# Patient Record
Sex: FEMALE | Race: ASIAN | NOT HISPANIC OR LATINO
[De-identification: names, ages, dates, MRNs, and addresses within clinical notes are randomized per-mention and may not be internally consistent; named-entity substitution may affect disease eponyms.]

---

## 2020-07-24 PROBLEM — Z00.00 ENCOUNTER FOR PREVENTIVE HEALTH EXAMINATION: Status: ACTIVE | Noted: 2020-07-24

## 2020-07-28 ENCOUNTER — APPOINTMENT (OUTPATIENT)
Dept: ANTEPARTUM | Facility: CLINIC | Age: 35
End: 2020-07-28
Payer: COMMERCIAL

## 2020-07-28 ENCOUNTER — TRANSCRIPTION ENCOUNTER (OUTPATIENT)
Age: 35
End: 2020-07-28

## 2020-07-28 ENCOUNTER — ASOB RESULT (OUTPATIENT)
Age: 35
End: 2020-07-28

## 2020-07-28 PROCEDURE — 99204 OFFICE O/P NEW MOD 45 MIN: CPT | Mod: 95

## 2020-08-18 ENCOUNTER — APPOINTMENT (OUTPATIENT)
Dept: ANTEPARTUM | Facility: CLINIC | Age: 35
End: 2020-08-18
Payer: COMMERCIAL

## 2020-08-18 ENCOUNTER — ASOB RESULT (OUTPATIENT)
Age: 35
End: 2020-08-18

## 2020-08-18 PROCEDURE — 36416 COLLJ CAPILLARY BLOOD SPEC: CPT

## 2020-08-18 PROCEDURE — 76813 OB US NUCHAL MEAS 1 GEST: CPT

## 2020-08-18 PROCEDURE — 76801 OB US < 14 WKS SINGLE FETUS: CPT

## 2020-10-13 ENCOUNTER — ASOB RESULT (OUTPATIENT)
Age: 35
End: 2020-10-13

## 2020-10-13 ENCOUNTER — APPOINTMENT (OUTPATIENT)
Dept: ANTEPARTUM | Facility: CLINIC | Age: 35
End: 2020-10-13
Payer: COMMERCIAL

## 2020-10-13 PROCEDURE — 76811 OB US DETAILED SNGL FETUS: CPT

## 2020-10-27 ENCOUNTER — APPOINTMENT (OUTPATIENT)
Dept: ANTEPARTUM | Facility: CLINIC | Age: 35
End: 2020-10-27
Payer: COMMERCIAL

## 2020-10-27 ENCOUNTER — ASOB RESULT (OUTPATIENT)
Age: 35
End: 2020-10-27

## 2020-10-27 PROCEDURE — 99072 ADDL SUPL MATRL&STAF TM PHE: CPT

## 2020-10-27 PROCEDURE — 76816 OB US FOLLOW-UP PER FETUS: CPT

## 2020-11-10 ENCOUNTER — NON-APPOINTMENT (OUTPATIENT)
Age: 35
End: 2020-11-10

## 2020-11-11 ENCOUNTER — APPOINTMENT (OUTPATIENT)
Dept: ENDOCRINOLOGY | Facility: CLINIC | Age: 35
End: 2020-11-11
Payer: COMMERCIAL

## 2020-11-11 ENCOUNTER — NON-APPOINTMENT (OUTPATIENT)
Age: 35
End: 2020-11-11

## 2020-11-11 VITALS
HEIGHT: 64.76 IN | OXYGEN SATURATION: 98 % | WEIGHT: 181.19 LBS | BODY MASS INDEX: 30.56 KG/M2 | TEMPERATURE: 99.3 F | SYSTOLIC BLOOD PRESSURE: 142 MMHG | HEART RATE: 108 BPM | DIASTOLIC BLOOD PRESSURE: 85 MMHG

## 2020-11-11 PROCEDURE — 36415 COLL VENOUS BLD VENIPUNCTURE: CPT

## 2020-11-11 PROCEDURE — 99072 ADDL SUPL MATRL&STAF TM PHE: CPT

## 2020-11-11 PROCEDURE — 99205 OFFICE O/P NEW HI 60 MIN: CPT | Mod: 25

## 2020-11-12 LAB
T4 FREE SERPL-MCNC: 1 NG/DL
TSH SERPL-ACNC: 4.28 UIU/ML

## 2020-11-13 NOTE — HISTORY OF PRESENT ILLNESS
[FreeTextEntry1] : CC: Hypothyroidism\par \par This is a 35-year-old female with subclinical hypothyroidism, hypertension, here for consultation.\par She is currently 24 weeks and 3 days pregnant.\par Blood work from July 22, 2020 showed TSH 7.03.  Free T4 was normal.  Repeat blood work on September 2, 2020 showed TSH 5.200, total T4 11.8.\par There is no prior history of thyroid disease.  There is no history of thyroid disease in the family.\par She reports constipation.\par LMP 5/24/2020\par TRAE 2/28/2021

## 2020-11-13 NOTE — PHYSICAL EXAM
[Alert] : alert [Well Nourished] : well nourished [Healthy Appearance] : healthy appearance [No Acute Distress] : no acute distress [Well Developed] : well developed [Normal Voice/Communication] : normal voice communication [Normal Sclera/Conjunctiva] : normal sclera/conjunctiva [No Proptosis] : no proptosis [No Neck Mass] : no neck mass was observed [No LAD] : no lymphadenopathy [Supple] : the neck was supple [Thyroid Not Enlarged] : the thyroid was not enlarged [No Thyroid Nodules] : no palpable thyroid nodules [No Respiratory Distress] : no respiratory distress [No Accessory Muscle Use] : no accessory muscle use [Normal Rate and Effort] : normal respiratory rate and effort [Normal Rate] : heart rate was normal [No Edema] : no peripheral edema [Normal Gait] : normal gait [No Clubbing, Cyanosis] : no clubbing  or cyanosis of the fingernails [No Involuntary Movements] : no involuntary movements were seen [No Tremors] : no tremors [Normal Affect] : the affect was normal [Normal Insight/Judgement] : insight and judgment were intact [Normal Mood] : the mood was normal [Acanthosis Nigricans] : no acanthosis nigricans [Hirsutism] : no hirsutism

## 2020-11-13 NOTE — CONSULT LETTER
[Dear  ___] : Dear  [unfilled], [Consult Letter:] : I had the pleasure of evaluating your patient, [unfilled]. [Please see my note below.] : Please see my note below. [Consult Closing:] : Thank you very much for allowing me to participate in the care of this patient.  If you have any questions, please do not hesitate to contact me. [Sincerely,] : Sincerely, [FreeTextEntry3] : Bucky Colin MD, FACE\par

## 2020-11-13 NOTE — ASSESSMENT
[Levothyroxine] : The patient was instructed to take Levothyroxine on an empty stomach, separate from vitamins, and wait at least 30 minutes before eating [FreeTextEntry1] : This is a 35-year-old female with subclinical hypothyroidism who is currently 24 weeks and 3 days pregnant.\par Initiate levothyroxine 100 mcg daily.\par She was advised to take this in the morning on an empty stomach, separate from her prenatal vitamin.\par Check TFTs in 2 weeks.\par Complications of uncontrolled hypothyroidism during pregnancy were reviewed.\par

## 2020-11-13 NOTE — REVIEW OF SYSTEMS
[Recent Weight Gain (___ Lbs)] : recent weight gain: [unfilled] lbs [Constipation] : constipation [All other systems negative] : All other systems negative

## 2020-11-15 LAB
THYROGLOB AB SERPL-ACNC: <20 IU/ML
THYROPEROXIDASE AB SERPL IA-ACNC: <10 IU/ML

## 2020-11-25 ENCOUNTER — APPOINTMENT (OUTPATIENT)
Dept: ANTEPARTUM | Facility: CLINIC | Age: 35
End: 2020-11-25
Payer: COMMERCIAL

## 2020-11-25 ENCOUNTER — ASOB RESULT (OUTPATIENT)
Age: 35
End: 2020-11-25

## 2020-11-25 PROCEDURE — 99072 ADDL SUPL MATRL&STAF TM PHE: CPT

## 2020-11-25 PROCEDURE — 76816 OB US FOLLOW-UP PER FETUS: CPT

## 2020-11-25 PROCEDURE — 76819 FETAL BIOPHYS PROFIL W/O NST: CPT

## 2020-12-03 ENCOUNTER — OUTPATIENT (OUTPATIENT)
Dept: INPATIENT UNIT | Facility: HOSPITAL | Age: 35
LOS: 1 days | Discharge: HOME | End: 2020-12-03
Payer: COMMERCIAL

## 2020-12-03 VITALS
HEART RATE: 95 BPM | DIASTOLIC BLOOD PRESSURE: 94 MMHG | TEMPERATURE: 98 F | SYSTOLIC BLOOD PRESSURE: 177 MMHG | RESPIRATION RATE: 16 BRPM

## 2020-12-03 VITALS — HEART RATE: 94 BPM | SYSTOLIC BLOOD PRESSURE: 146 MMHG | DIASTOLIC BLOOD PRESSURE: 94 MMHG

## 2020-12-03 DIAGNOSIS — O26.899 OTHER SPECIFIED PREGNANCY RELATED CONDITIONS, UNSPECIFIED TRIMESTER: ICD-10-CM

## 2020-12-03 DIAGNOSIS — Z3A.00 WEEKS OF GESTATION OF PREGNANCY NOT SPECIFIED: ICD-10-CM

## 2020-12-03 DIAGNOSIS — Z98.890 OTHER SPECIFIED POSTPROCEDURAL STATES: Chronic | ICD-10-CM

## 2020-12-03 LAB
ALBUMIN SERPL ELPH-MCNC: 3.4 G/DL — SIGNIFICANT CHANGE UP (ref 3.3–5)
ALP SERPL-CCNC: 66 U/L — SIGNIFICANT CHANGE UP (ref 40–120)
ALT FLD-CCNC: 13 U/L — SIGNIFICANT CHANGE UP (ref 4–33)
ANION GAP SERPL CALC-SCNC: 11 MMO/L — SIGNIFICANT CHANGE UP (ref 7–14)
APPEARANCE UR: CLEAR — SIGNIFICANT CHANGE UP
APTT BLD: 26.9 SEC — LOW (ref 27–36.3)
AST SERPL-CCNC: 13 U/L — SIGNIFICANT CHANGE UP (ref 4–32)
BASOPHILS # BLD AUTO: 0.04 K/UL — SIGNIFICANT CHANGE UP (ref 0–0.2)
BASOPHILS NFR BLD AUTO: 0.3 % — SIGNIFICANT CHANGE UP (ref 0–2)
BILIRUB SERPL-MCNC: 0.3 MG/DL — SIGNIFICANT CHANGE UP (ref 0.2–1.2)
BILIRUB UR-MCNC: NEGATIVE — SIGNIFICANT CHANGE UP
BLOOD UR QL VISUAL: NEGATIVE — SIGNIFICANT CHANGE UP
BUN SERPL-MCNC: 8 MG/DL — SIGNIFICANT CHANGE UP (ref 7–23)
CALCIUM SERPL-MCNC: 7.8 MG/DL — LOW (ref 8.4–10.5)
CHLORIDE SERPL-SCNC: 106 MMOL/L — SIGNIFICANT CHANGE UP (ref 98–107)
CO2 SERPL-SCNC: 19 MMOL/L — LOW (ref 22–31)
COLOR SPEC: COLORLESS — SIGNIFICANT CHANGE UP
CREAT ?TM UR-MCNC: 10 MG/DL — SIGNIFICANT CHANGE UP
CREAT SERPL-MCNC: 0.54 MG/DL — SIGNIFICANT CHANGE UP (ref 0.5–1.3)
EOSINOPHIL # BLD AUTO: 0.09 K/UL — SIGNIFICANT CHANGE UP (ref 0–0.5)
EOSINOPHIL NFR BLD AUTO: 0.8 % — SIGNIFICANT CHANGE UP (ref 0–6)
FIBRINOGEN PPP-MCNC: 613 MG/DL — HIGH (ref 290–520)
GLUCOSE SERPL-MCNC: 82 MG/DL — SIGNIFICANT CHANGE UP (ref 70–99)
GLUCOSE UR-MCNC: NEGATIVE — SIGNIFICANT CHANGE UP
HCT VFR BLD CALC: 39.3 % — SIGNIFICANT CHANGE UP (ref 34.5–45)
HGB BLD-MCNC: 12.7 G/DL — SIGNIFICANT CHANGE UP (ref 11.5–15.5)
IMM GRANULOCYTES NFR BLD AUTO: 0.8 % — SIGNIFICANT CHANGE UP (ref 0–1.5)
INR BLD: 1.03 — SIGNIFICANT CHANGE UP (ref 0.88–1.16)
KETONES UR-MCNC: NEGATIVE — SIGNIFICANT CHANGE UP
LDH SERPL L TO P-CCNC: 167 U/L — SIGNIFICANT CHANGE UP (ref 135–225)
LEUKOCYTE ESTERASE UR-ACNC: NEGATIVE — SIGNIFICANT CHANGE UP
LYMPHOCYTES # BLD AUTO: 1.54 K/UL — SIGNIFICANT CHANGE UP (ref 1–3.3)
LYMPHOCYTES # BLD AUTO: 13.1 % — SIGNIFICANT CHANGE UP (ref 13–44)
MCHC RBC-ENTMCNC: 28.7 PG — SIGNIFICANT CHANGE UP (ref 27–34)
MCHC RBC-ENTMCNC: 32.3 % — SIGNIFICANT CHANGE UP (ref 32–36)
MCV RBC AUTO: 88.7 FL — SIGNIFICANT CHANGE UP (ref 80–100)
MONOCYTES # BLD AUTO: 0.79 K/UL — SIGNIFICANT CHANGE UP (ref 0–0.9)
MONOCYTES NFR BLD AUTO: 6.7 % — SIGNIFICANT CHANGE UP (ref 2–14)
NEUTROPHILS # BLD AUTO: 9.2 K/UL — HIGH (ref 1.8–7.4)
NEUTROPHILS NFR BLD AUTO: 78.3 % — HIGH (ref 43–77)
NITRITE UR-MCNC: NEGATIVE — SIGNIFICANT CHANGE UP
NRBC # FLD: 0 K/UL — SIGNIFICANT CHANGE UP (ref 0–0)
PH UR: 6.5 — SIGNIFICANT CHANGE UP (ref 5–8)
PLATELET # BLD AUTO: 285 K/UL — SIGNIFICANT CHANGE UP (ref 150–400)
PMV BLD: 9.3 FL — SIGNIFICANT CHANGE UP (ref 7–13)
POTASSIUM SERPL-MCNC: 3.2 MMOL/L — LOW (ref 3.5–5.3)
POTASSIUM SERPL-SCNC: 3.2 MMOL/L — LOW (ref 3.5–5.3)
PROT SERPL-MCNC: 6.1 G/DL — SIGNIFICANT CHANGE UP (ref 6–8.3)
PROT UR-MCNC: < 4 MG/DL — SIGNIFICANT CHANGE UP
PROT UR-MCNC: NEGATIVE — SIGNIFICANT CHANGE UP
PROTHROM AB SERPL-ACNC: 11.8 SEC — SIGNIFICANT CHANGE UP (ref 10.6–13.6)
RBC # BLD: 4.43 M/UL — SIGNIFICANT CHANGE UP (ref 3.8–5.2)
RBC # FLD: 13.6 % — SIGNIFICANT CHANGE UP (ref 10.3–14.5)
SODIUM SERPL-SCNC: 136 MMOL/L — SIGNIFICANT CHANGE UP (ref 135–145)
SP GR SPEC: 1 — LOW (ref 1–1.04)
URATE SERPL-MCNC: 3.5 MG/DL — SIGNIFICANT CHANGE UP (ref 2.5–7)
UROBILINOGEN FLD QL: NORMAL — SIGNIFICANT CHANGE UP
WBC # BLD: 11.75 K/UL — HIGH (ref 3.8–10.5)
WBC # FLD AUTO: 11.75 K/UL — HIGH (ref 3.8–10.5)

## 2020-12-03 PROCEDURE — 76818 FETAL BIOPHYS PROFILE W/NST: CPT | Mod: 26

## 2020-12-03 PROCEDURE — 99214 OFFICE O/P EST MOD 30 MIN: CPT

## 2020-12-03 RX ORDER — LABETALOL HCL 100 MG
200 TABLET ORAL ONCE
Refills: 0 | Status: COMPLETED | OUTPATIENT
Start: 2020-12-03 | End: 2020-12-03

## 2020-12-03 RX ADMIN — Medication 200 MILLIGRAM(S): at 23:11

## 2020-12-03 NOTE — OB PROVIDER TRIAGE NOTE - NSOBPROVIDERNOTE_OBGYN_ALL_OB_FT
Evidence of superimposed preeclampsia w/o sf     d/W Dr. Henry     -take procardia dose now  -check bp in 1 hour     -follow up in office on Monday  -rx for labetalol 200 mg bid sent to pharmacy Evidence of superimposed preeclampsia w/o sf     d/W Dr. Henry     -take procardia dose now  -check bp in 1 hour   -repeat bp 158/100, 146/94    d/w Dr. Henry   -give 1 dose of labetalol 200 mg stat   -cleared for discharge home   -follow up in office on Monday for bp check   -rx for labetalol 200 mg bid sent to pharmacy  -fetal kick count reviewed   -warning signs reviewed

## 2020-12-03 NOTE — OB PROVIDER TRIAGE NOTE - HISTORY OF PRESENT ILLNESS
36 yo  27 4/7 weeks sent in from the office for elevated bp (150/108). Pt reports good fetal movement. Denies leaking of fluid, vaginal bleeding, pain, headache, visual changes, or epigastric pain.     Current a/p complications: CHTN on nifedipine 30 mg daily, was on labetalol 100 mg bid as well but was discontinued with pregnancy     Allergies: NKDA  Medications: PNV, Nifedipine 30 mg, synthroid 100 mcg   PMH: CHTN, subclinical hypothyroidism   PSH: Left lumpectomy (benign)   OB/GYN: Denies   Social: Denies   Psychosocial: Denies    36 yo  27 4/7 weeks sent in from the office for elevated bp (150/108). Pt reports good fetal movement. Denies leaking of fluid, vaginal bleeding, pain, headache, visual changes, or epigastric pain.     Current a/p complications: CHTN on nifedipine 30 mg daily, was on labetalol 100 mg bid as well but was discontinued with pregnancy     Allergies: NKDA  Medications: PNV, Nifedipine 30 mg, synthroid 100 mcg   PMH: CHTN, subclinical hypothyroidism   PSH: Left lumpectomy (benign)   OB/GYN: Fibroids x 2   Social: Denies   Psychosocial: Denies

## 2020-12-03 NOTE — OB PROVIDER TRIAGE NOTE - NSHPPHYSICALEXAM_GEN_ALL_CORE
Abdomen soft, nontender     NST   toco     TAUS cephalic presentation, anterior placenta, autumn 17.31 cm, bpp 8/8 Abdomen soft, nontender     NST cat 1 tracing   toco no contractions by toco     TAUS cephalic presentation, anterior placenta, autumn 17.31 cm, bpp 8/8 Vital Signs Last 24 Hrs  T(C): 36.8 (03 Dec 2020 19:53), Max: 36.8 (03 Dec 2020 19:53)  T(F): 98.2 (03 Dec 2020 19:53), Max: 98.2 (03 Dec 2020 19:53)  HR: 89 (03 Dec 2020 21:32) (82 - 97)  BP: 140/91 (03 Dec 2020 21:32) (140/87 - 177/94)  RR: 16 (03 Dec 2020 19:53) (16 - 16)    Abdomen soft, nontender     NST cat 1 tracing   toco no contractions by toco     TAUS cephalic presentation, anterior placenta, autumn 17.31 cm, bpp 8/8

## 2020-12-03 NOTE — OB RN TRIAGE NOTE - CURRENT PREGNANCY COMPLICATIONS, OB PROFILE
Hypertensive Disorder Composite Graft Text: The defect edges were debeveled with a #15 scalpel blade.  Given the location of the defect, shape of the defect, the proximity to free margins and the fact the defect was full thickness a composite graft was deemed most appropriate.  The defect was outline and then transferred to the donor site.  A full thickness graft was then excised from the donor site. The graft was then placed in the primary defect, oriented appropriately and then sutured into place.  The secondary defect was then repaired using a primary closure.

## 2020-12-03 NOTE — OB PROVIDER TRIAGE NOTE - NSHPLABSRESULTS_GEN_ALL_CORE
CBC Full  -  ( 03 Dec 2020 20:15 )  WBC Count : 11.75 K/uL  RBC Count : 4.43 M/uL  Hemoglobin : 12.7 g/dL  Hematocrit : 39.3 %  Platelet Count - Automated : 285 K/uL  Mean Cell Volume : 88.7 fL  Mean Cell Hemoglobin : 28.7 pg  Mean Cell Hemoglobin Concentration : 32.3 %  Auto Neutrophil # : 9.20 K/uL  Auto Lymphocyte # : 1.54 K/uL  Auto Monocyte # : 0.79 K/uL  Auto Eosinophil # : 0.09 K/uL  Auto Basophil # : 0.04 K/uL  Auto Neutrophil % : 78.3 %  Auto Lymphocyte % : 13.1 %  Auto Monocyte % : 6.7 %  Auto Eosinophil % : 0.8 %  Auto Basophil % : 0.3 %        136  |  106  |  8   ----------------------------<  82  3.2<L>   |  19<L>  |  0.54    Ca    7.8<L>      03 Dec 2020 20:15    TPro  6.1  /  Alb  3.4  /  TBili  0.3  /  DBili  x   /  AST  13  /  ALT  13  /  AlkPhos  66  12-03    uric acid 3.5    ldh 167     PT/INR - ( 03 Dec 2020 10:15 )   PT: 11.8 SEC;   INR: 1.03          PTT - ( 03 Dec 2020 10:15 )  PTT:26.9 SEC    fibrinogen 613    Urinalysis Basic - ( 03 Dec 2020 20:18 )    Color: COLORLESS / Appearance: CLEAR / S.004 / pH: 6.5  Gluc: NEGATIVE / Ketone: NEGATIVE  / Bili: NEGATIVE / Urobili: NORMAL   Blood: NEGATIVE / Protein: NEGATIVE / Nitrite: NEGATIVE   Leuk Esterase: NEGATIVE / RBC: x / WBC x   Sq Epi: x / Non Sq Epi: x / Bacteria: x    PCR CBC Full  -  ( 03 Dec 2020 20:15 )  WBC Count : 11.75 K/uL  RBC Count : 4.43 M/uL  Hemoglobin : 12.7 g/dL  Hematocrit : 39.3 %  Platelet Count - Automated : 285 K/uL  Mean Cell Volume : 88.7 fL  Mean Cell Hemoglobin : 28.7 pg  Mean Cell Hemoglobin Concentration : 32.3 %  Auto Neutrophil # : 9.20 K/uL  Auto Lymphocyte # : 1.54 K/uL  Auto Monocyte # : 0.79 K/uL  Auto Eosinophil # : 0.09 K/uL  Auto Basophil # : 0.04 K/uL  Auto Neutrophil % : 78.3 %  Auto Lymphocyte % : 13.1 %  Auto Monocyte % : 6.7 %  Auto Eosinophil % : 0.8 %  Auto Basophil % : 0.3 %        136  |  106  |  8   ----------------------------<  82  3.2<L>   |  19<L>  |  0.54    Ca    7.8<L>      03 Dec 2020 20:15    TPro  6.1  /  Alb  3.4  /  TBili  0.3  /  DBili  x   /  AST  13  /  ALT  13  /  AlkPhos  66  12-03    uric acid 3.5    ldh 167     PT/INR - ( 03 Dec 2020 10:15 )   PT: 11.8 SEC;   INR: 1.03          PTT - ( 03 Dec 2020 10:15 )  PTT:26.9 SEC    fibrinogen 613    Urinalysis Basic - ( 03 Dec 2020 20:18 )    Color: COLORLESS / Appearance: CLEAR / S.004 / pH: 6.5  Gluc: NEGATIVE / Ketone: NEGATIVE  / Bili: NEGATIVE / Urobili: NORMAL   Blood: NEGATIVE / Protein: NEGATIVE / Nitrite: NEGATIVE   Leuk Esterase: NEGATIVE / RBC: x / WBC x   Sq Epi: x / Non Sq Epi: x / Bacteria: x    PCR 0.4

## 2020-12-03 NOTE — OB PROVIDER TRIAGE NOTE - ADDITIONAL INSTRUCTIONS
-give 1 dose of labetalol 200 mg stat   -cleared for discharge home   -follow up in office on Monday for bp check   -rx for labetalol 200 mg bid sent to pharmacy  -fetal kick count reviewed   -warning signs reviewed

## 2020-12-07 ENCOUNTER — APPOINTMENT (OUTPATIENT)
Dept: ENDOCRINOLOGY | Facility: CLINIC | Age: 35
End: 2020-12-07
Payer: COMMERCIAL

## 2020-12-07 ENCOUNTER — INPATIENT (INPATIENT)
Facility: HOSPITAL | Age: 35
LOS: 0 days | Discharge: HOME | End: 2020-12-08
Attending: STUDENT IN AN ORGANIZED HEALTH CARE EDUCATION/TRAINING PROGRAM | Admitting: STUDENT IN AN ORGANIZED HEALTH CARE EDUCATION/TRAINING PROGRAM
Payer: COMMERCIAL

## 2020-12-07 VITALS
RESPIRATION RATE: 16 BRPM | SYSTOLIC BLOOD PRESSURE: 132 MMHG | HEART RATE: 99 BPM | DIASTOLIC BLOOD PRESSURE: 92 MMHG | TEMPERATURE: 99 F

## 2020-12-07 VITALS
OXYGEN SATURATION: 98 % | WEIGHT: 182.3 LBS | HEART RATE: 91 BPM | BODY MASS INDEX: 30.01 KG/M2 | DIASTOLIC BLOOD PRESSURE: 74 MMHG | SYSTOLIC BLOOD PRESSURE: 110 MMHG | TEMPERATURE: 98.4 F | HEIGHT: 65.35 IN

## 2020-12-07 DIAGNOSIS — Z3A.00 WEEKS OF GESTATION OF PREGNANCY NOT SPECIFIED: ICD-10-CM

## 2020-12-07 DIAGNOSIS — O26.899 OTHER SPECIFIED PREGNANCY RELATED CONDITIONS, UNSPECIFIED TRIMESTER: ICD-10-CM

## 2020-12-07 DIAGNOSIS — Z98.890 OTHER SPECIFIED POSTPROCEDURAL STATES: Chronic | ICD-10-CM

## 2020-12-07 PROBLEM — I10 ESSENTIAL (PRIMARY) HYPERTENSION: Chronic | Status: ACTIVE | Noted: 2020-12-03

## 2020-12-07 PROBLEM — E03.9 HYPOTHYROIDISM, UNSPECIFIED: Chronic | Status: ACTIVE | Noted: 2020-12-03

## 2020-12-07 LAB
ALBUMIN SERPL ELPH-MCNC: 3.8 G/DL — SIGNIFICANT CHANGE UP (ref 3.3–5)
ALBUMIN SERPL ELPH-MCNC: 3.8 G/DL — SIGNIFICANT CHANGE UP (ref 3.3–5)
ALP SERPL-CCNC: 89 U/L — SIGNIFICANT CHANGE UP (ref 40–120)
ALP SERPL-CCNC: 92 U/L — SIGNIFICANT CHANGE UP (ref 40–120)
ALT FLD-CCNC: 12 U/L — SIGNIFICANT CHANGE UP (ref 4–33)
ALT FLD-CCNC: 12 U/L — SIGNIFICANT CHANGE UP (ref 4–33)
ANION GAP SERPL CALC-SCNC: 14 MMOL/L — SIGNIFICANT CHANGE UP (ref 7–14)
ANION GAP SERPL CALC-SCNC: 9 MMOL/L — SIGNIFICANT CHANGE UP (ref 7–14)
APPEARANCE UR: CLEAR — SIGNIFICANT CHANGE UP
APTT BLD: 27.3 SEC — SIGNIFICANT CHANGE UP (ref 27–36.3)
AST SERPL-CCNC: 17 U/L — SIGNIFICANT CHANGE UP (ref 4–32)
AST SERPL-CCNC: 17 U/L — SIGNIFICANT CHANGE UP (ref 4–32)
BILIRUB SERPL-MCNC: 0.6 MG/DL — SIGNIFICANT CHANGE UP (ref 0.2–1.2)
BILIRUB SERPL-MCNC: 0.7 MG/DL — SIGNIFICANT CHANGE UP (ref 0.2–1.2)
BILIRUB UR-MCNC: NEGATIVE — SIGNIFICANT CHANGE UP
BUN SERPL-MCNC: 5 MG/DL — LOW (ref 7–23)
BUN SERPL-MCNC: 5 MG/DL — LOW (ref 7–23)
CALCIUM SERPL-MCNC: 9.1 MG/DL — SIGNIFICANT CHANGE UP (ref 8.4–10.5)
CALCIUM SERPL-MCNC: 9.1 MG/DL — SIGNIFICANT CHANGE UP (ref 8.4–10.5)
CHLORIDE SERPL-SCNC: 102 MMOL/L — SIGNIFICANT CHANGE UP (ref 98–107)
CHLORIDE SERPL-SCNC: 103 MMOL/L — SIGNIFICANT CHANGE UP (ref 98–107)
CO2 SERPL-SCNC: 19 MMOL/L — LOW (ref 22–31)
CO2 SERPL-SCNC: 22 MMOL/L — SIGNIFICANT CHANGE UP (ref 22–31)
COLOR SPEC: SIGNIFICANT CHANGE UP
CREAT ?TM UR-MCNC: 45 MG/DL — SIGNIFICANT CHANGE UP
CREAT SERPL-MCNC: 0.58 MG/DL — SIGNIFICANT CHANGE UP (ref 0.5–1.3)
CREAT SERPL-MCNC: 0.58 MG/DL — SIGNIFICANT CHANGE UP (ref 0.5–1.3)
DIFF PNL FLD: NEGATIVE — SIGNIFICANT CHANGE UP
FIBRINOGEN PPP-MCNC: 729 MG/DL — HIGH (ref 300–520)
GLUCOSE SERPL-MCNC: 78 MG/DL — SIGNIFICANT CHANGE UP (ref 70–99)
GLUCOSE SERPL-MCNC: 89 MG/DL — SIGNIFICANT CHANGE UP (ref 70–99)
GLUCOSE UR QL: NEGATIVE — SIGNIFICANT CHANGE UP
HCT VFR BLD CALC: 37.2 % — SIGNIFICANT CHANGE UP (ref 34.5–45)
HGB BLD-MCNC: 12.2 G/DL — SIGNIFICANT CHANGE UP (ref 11.5–15.5)
INR BLD: 1.09 RATIO — SIGNIFICANT CHANGE UP (ref 0.88–1.17)
KETONES UR-MCNC: ABNORMAL
LDH SERPL L TO P-CCNC: 375 U/L — HIGH (ref 135–225)
LEUKOCYTE ESTERASE UR-ACNC: NEGATIVE — SIGNIFICANT CHANGE UP
MCHC RBC-ENTMCNC: 29 PG — SIGNIFICANT CHANGE UP (ref 27–34)
MCHC RBC-ENTMCNC: 32.8 GM/DL — SIGNIFICANT CHANGE UP (ref 32–36)
MCV RBC AUTO: 88.6 FL — SIGNIFICANT CHANGE UP (ref 80–100)
NITRITE UR-MCNC: NEGATIVE — SIGNIFICANT CHANGE UP
NRBC # BLD: 0 /100 WBCS — SIGNIFICANT CHANGE UP
NRBC # FLD: 0 K/UL — SIGNIFICANT CHANGE UP
PH UR: 7.5 — SIGNIFICANT CHANGE UP (ref 5–8)
PLATELET # BLD AUTO: 265 K/UL — SIGNIFICANT CHANGE UP (ref 150–400)
POTASSIUM SERPL-MCNC: 3.6 MMOL/L — SIGNIFICANT CHANGE UP (ref 3.5–5.3)
POTASSIUM SERPL-MCNC: 4.1 MMOL/L — SIGNIFICANT CHANGE UP (ref 3.5–5.3)
POTASSIUM SERPL-SCNC: 3.6 MMOL/L — SIGNIFICANT CHANGE UP (ref 3.5–5.3)
POTASSIUM SERPL-SCNC: 4.1 MMOL/L — SIGNIFICANT CHANGE UP (ref 3.5–5.3)
PROT ?TM UR-MCNC: 6 MG/DL — SIGNIFICANT CHANGE UP
PROT SERPL-MCNC: 6.8 G/DL — SIGNIFICANT CHANGE UP (ref 6–8.3)
PROT SERPL-MCNC: 6.8 G/DL — SIGNIFICANT CHANGE UP (ref 6–8.3)
PROT UR-MCNC: NEGATIVE — SIGNIFICANT CHANGE UP
PROTHROM AB SERPL-ACNC: 12.5 SEC — SIGNIFICANT CHANGE UP (ref 9.8–13.1)
RBC # BLD: 4.2 M/UL — SIGNIFICANT CHANGE UP (ref 3.8–5.2)
RBC # FLD: 13.6 % — SIGNIFICANT CHANGE UP (ref 10.3–14.5)
RH IG SCN BLD-IMP: POSITIVE — SIGNIFICANT CHANGE UP
SARS-COV-2 RNA SPEC QL NAA+PROBE: SIGNIFICANT CHANGE UP
SODIUM SERPL-SCNC: 134 MMOL/L — LOW (ref 135–145)
SODIUM SERPL-SCNC: 135 MMOL/L — SIGNIFICANT CHANGE UP (ref 135–145)
SP GR SPEC: 1.01 — LOW (ref 1.01–1.02)
THROMBIN TIME: 21.2 SEC — SIGNIFICANT CHANGE UP (ref 16–26)
URATE SERPL-MCNC: 4.6 MG/DL — SIGNIFICANT CHANGE UP (ref 2.5–7)
URATE SERPL-MCNC: 5.3 MG/DL — SIGNIFICANT CHANGE UP (ref 2.5–7)
UROBILINOGEN FLD QL: SIGNIFICANT CHANGE UP
WBC # BLD: 11.86 K/UL — HIGH (ref 3.8–10.5)
WBC # FLD AUTO: 11.86 K/UL — HIGH (ref 3.8–10.5)

## 2020-12-07 PROCEDURE — 99072 ADDL SUPL MATRL&STAF TM PHE: CPT

## 2020-12-07 PROCEDURE — 99213 OFFICE O/P EST LOW 20 MIN: CPT | Mod: 25

## 2020-12-07 PROCEDURE — 36415 COLL VENOUS BLD VENIPUNCTURE: CPT

## 2020-12-07 RX ORDER — LABETALOL HCL 100 MG
200 TABLET ORAL EVERY 12 HOURS
Refills: 0 | Status: DISCONTINUED | OUTPATIENT
Start: 2020-12-07 | End: 2020-12-08

## 2020-12-07 RX ORDER — NIFEDIPINE 30 MG
30 TABLET, EXTENDED RELEASE 24 HR ORAL DAILY
Refills: 0 | Status: DISCONTINUED | OUTPATIENT
Start: 2020-12-07 | End: 2020-12-08

## 2020-12-07 RX ORDER — LEVOTHYROXINE SODIUM 125 MCG
100 TABLET ORAL DAILY
Refills: 0 | Status: DISCONTINUED | OUTPATIENT
Start: 2020-12-08 | End: 2020-12-08

## 2020-12-07 RX ORDER — ASPIRIN/CALCIUM CARB/MAGNESIUM 324 MG
81 TABLET ORAL DAILY
Refills: 0 | Status: DISCONTINUED | OUTPATIENT
Start: 2020-12-07 | End: 2020-12-08

## 2020-12-07 RX ADMIN — Medication 200 MILLIGRAM(S): at 16:16

## 2020-12-07 RX ADMIN — Medication 81 MILLIGRAM(S): at 18:20

## 2020-12-07 RX ADMIN — Medication 30 MILLIGRAM(S): at 22:37

## 2020-12-07 NOTE — OB PROVIDER TRIAGE NOTE - NSHPPHYSICALEXAM_GEN_ALL_CORE
Vital Signs Last 24 Hrs  T(C): 37 (07 Dec 2020 11:37), Max: 37 (07 Dec 2020 11:37)  T(F): 98.6 (07 Dec 2020 11:37), Max: 98.6 (07 Dec 2020 11:37)  HR: 83 (07 Dec 2020 13:08) (81 - 99)  BP: 127/80 (07 Dec 2020 13:08) (121/76 - 132/92)  BP(mean): --  RR: 16 (07 Dec 2020 11:37) (16 - 16)  SpO2: --    Abdomen gravid, soft and nontender  NST -  [facility] Labs

## 2020-12-07 NOTE — OB PROVIDER TRIAGE NOTE - NSHPLABSRESULTS_GEN_ALL_CORE
[facility] LABS CBC -  PCR -  AST/ALT -  LDH -  Serum Creatinine -  Uric acid -  PT/PTT -  INR -  FA -  UA -                          12.2   11.86 )-----------( 265      ( 07 Dec 2020 12:09 )             37.2     12    134<L>  |  103  |  5<L>  ----------------------------<  89  4.1   |  22  |  0.58    Ca    9.1      07 Dec 2020 12:09    TPro  6.8  /  Alb  3.8  /  TBili  0.6  /  DBili  x   /  AST  17  /  ALT  12  /  AlkPhos  89  12-07          Urinalysis Basic - ( 07 Dec 2020 12:10 )    Color: Light Yellow / Appearance: Clear / S.009 / pH: x  Gluc: x / Ketone: Trace  / Bili: Negative / Urobili: <2 mg/dL   Blood: x / Protein: Negative / Nitrite: Negative   Leuk Esterase: Negative / RBC: x / WBC x   Sq Epi: x / Non Sq Epi: x / Bacteria: x      PT/INR - ( 07 Dec 2020 12:10 )   PT: 12.5 sec;   INR: 1.09 ratio         PTT - ( 07 Dec 2020 12:10 )  PTT:27.3 sec    CAPILLARY BLOOD GLUCOSE      P/c ratio 0.13    Fibrinogen Assay Fibrinogen Assay: 729 mg/dL (20 @ 12:10)

## 2020-12-07 NOTE — OB PROVIDER H&P - NSHPLABSRESULTS_GEN_ALL_CORE
CBC -  PCR -  AST/ALT -  LDH -  Serum Creatinine -  Uric acid -  PT/PTT -  INR -  FA -  UA -                          12.2   11.86 )-----------( 265      ( 07 Dec 2020 12:09 )             37.2     12    134<L>  |  103  |  5<L>  ----------------------------<  89  4.1   |  22  |  0.58    Ca    9.1      07 Dec 2020 12:09    TPro  6.8  /  Alb  3.8  /  TBili  0.6  /  DBili  x   /  AST  17  /  ALT  12  /  AlkPhos  89  12-07          Urinalysis Basic - ( 07 Dec 2020 12:10 )    Color: Light Yellow / Appearance: Clear / S.009 / pH: x  Gluc: x / Ketone: Trace  / Bili: Negative / Urobili: <2 mg/dL   Blood: x / Protein: Negative / Nitrite: Negative   Leuk Esterase: Negative / RBC: x / WBC x   Sq Epi: x / Non Sq Epi: x / Bacteria: x      PT/INR - ( 07 Dec 2020 12:10 )   PT: 12.5 sec;   INR: 1.09 ratio         PTT - ( 07 Dec 2020 12:10 )  PTT:27.3 sec    CAPILLARY BLOOD GLUCOSE      P/c ratio 0.13    Fibrinogen Assay Fibrinogen Assay: 729 mg/dL (20 @ 12:10)

## 2020-12-07 NOTE — ASSESSMENT
[FreeTextEntry1] : This is a 35-year-old female with subclinical hypothyroidism who is currently 28 weeks and 1 day pregnant.\par Continue levothyroxine 100 mcg daily for now.\par Check TFTs. \par She is clinically euthyroid. \par

## 2020-12-07 NOTE — OB PROVIDER TRIAGE NOTE - HISTORY OF PRESENT ILLNESS
sent from Dr Scanlon's office elevated /100 140/96 , siPEC   denies any headache visual disturbances or right upper epigastric pain sent from Dr Scanlon's office elevated /100 140/96 , siPEC   denies any headache visual disturbances or right upper epigastric pain     Patient is a 36y/o  @ 28 1/7wks gestation who was sent to triage from the office to r/o pec.    Denies headache visual disturbances, nausea, vomiting or RUQ/Epigastric pain.  Denies contractions, loss of fluid or vaginal bleeding.  Reports good fetal movements.    AP Course:  Patient is a known chronic  hypertensive; on Nifedipine 30er daily.  Found to have elevated B/Ps in the office on 12/3.  Was evaluated in triage and discharged home with Labetalol 200mg bid.  Meds - pnv, levothyroxine 100mcg daily, Nifedipine 30ER daily, & Labetalol 200mg bid, ASA 81mg daily.  NKDA

## 2020-12-07 NOTE — PHYSICAL EXAM
[Alert] : alert [Well Nourished] : well nourished [Healthy Appearance] : healthy appearance [No Acute Distress] : no acute distress [Well Developed] : well developed [Normal Voice/Communication] : normal voice communication [Normal Sclera/Conjunctiva] : normal sclera/conjunctiva [No Proptosis] : no proptosis [No Neck Mass] : no neck mass was observed [No LAD] : no lymphadenopathy [Supple] : the neck was supple [Thyroid Not Enlarged] : the thyroid was not enlarged [No Thyroid Nodules] : no palpable thyroid nodules [No Respiratory Distress] : no respiratory distress [No Accessory Muscle Use] : no accessory muscle use [Normal Rate and Effort] : normal respiratory rate and effort [Normal Rate] : heart rate was normal [No Edema] : no peripheral edema [Normal Gait] : normal gait [No Clubbing, Cyanosis] : no clubbing  or cyanosis of the fingernails [No Involuntary Movements] : no involuntary movements were seen [Acanthosis Nigricans] : no acanthosis nigricans [Hirsutism] : no hirsutism [No Tremors] : no tremors [Normal Affect] : the affect was normal [Normal Insight/Judgement] : insight and judgment were intact [Normal Mood] : the mood was normal [de-identified] : gravid

## 2020-12-07 NOTE — OB PROVIDER TRIAGE NOTE - NSOBPROVIDERNOTE_OBGYN_ALL_OB_FT
NKA  med hx:  cHTN dxed 2011   subclinical hypothyroid - Levothryoxine 100 mcg po qd  surg hx:   left breast lumpectomy- benign  gyn hx:   left breast lumpectomy- benign  fibroids x's 2  ob hx:   denies  meds:  Labetalol 200 mg po BID last dose 12/6 @ 2300, started 12/4   Procardia 30 mg XL last dose 12/6 @ 2300 prior to pregnancy   levothyroxine 100 mcg po qd  baby ASA   PNV   Vitamin D       EMTALA :   145/103 103 18 36.8 NKA  med hx:  cHTN dxed    subclinical hypothyroid - Levothryoxine 100 mcg po qd  surg hx:   left breast lumpectomy- benign  gyn hx:   left breast lumpectomy- benign  fibroids x's 2  ob hx:   denies  meds:  Labetalol 200 mg po BID last dose  @ 2300, started    Procardia 30 mg XL last dose  @ 2300 prior to pregnancy   levothyroxine 100 mcg po qd  baby ASA   PNV   Vitamin D       EMTALA :   145/103 103 18 36.8      PNC Dr Díaz  EDC 2021    Patient is a 36y/o  @ 28 1/7wks gestation who was sent to triage from the office to r/o pec.  Denies heache, visual disturbances, nausea, vomiting or RUQ/Epigastric pain.  Denies contractions, loss of fluid or vaginal bleeding.  Reports good fetal movements.    AP Course  Patient is a known chronic hypertensive; on Nifedipine 30er daily.  Found to have elevated B/Ps in the office on 12/3.  Was evaluated in triage and discharged home with Labetalol 200mg bid.  Meds - pnv, levothyroxine 100mcg daily, Nifedipine 30ER daily, & Labetalol 200mg bid, ASA 81mg daily.  NKDA    PMH - CHTN           - Hypothyroidism  OB - present    Abdomen gravid, soft and nontender  NST -  [facility] Labs    Discussed findings with Dr Gu: NKA  med hx:  cHTN dxed    subclinical hypothyroid - Levothryoxine 100 mcg po qd  surg hx:   left breast lumpectomy- benign  gyn hx:   left breast lumpectomy- benign  fibroids x's 2  ob hx:   denies  meds:  Labetalol 200 mg po BID last dose  @ 2300, started    Procardia 30 mg XL last dose  @ 2300 prior to pregnancy   levothyroxine 100 mcg po qd  baby ASA   PNV   Vitamin D       EMTALA :   145/103 103 18 36.8      PNC Dr Díaz  EDC 2021    Patient is a 36y/o  @ 28 1/7wks gestation who was sent to triage from the office to r/o pec.  Denies heache, visual disturbances, nausea, vomiting or RUQ/Epigastric pain.  Denies contractions, loss of fluid or vaginal bleeding.  Reports good fetal movements.    AP Course  Patient is a known chronic hypertensive; on Nifedipine 30er daily.  Found to have elevated B/Ps in the office on 12/3.  Was evaluated in triage and discharged home with Labetalol 200mg bid.  Meds - pnv, levothyroxine 100mcg daily, Nifedipine 30ER daily, & Labetalol 200mg bid, ASA 81mg daily.  NKDA    PMH - CHTN           - Hypothyroidism  OB - present    Vital Signs Last 24 Hrs  T(C): 37 (07 Dec 2020 11:37), Max: 37 (07 Dec 2020 11:37)  T(F): 98.6 (07 Dec 2020 11:37), Max: 98.6 (07 Dec 2020 11:37)  HR: 94 (07 Dec 2020 15:38) (81 - 99)  BP: 136/81 (07 Dec 2020 15:38) (121/76 - 140/82)  BP(mean): --  RR: 16 (07 Dec 2020 11:37) (16 - 16)  SpO2: --  Abdomen gravid, soft and nontender  NST   [facility] Labs  Regular contractions noted on FHT.  Patient denies feeling abdominal cramping/contractions.  SSE - cervix appears closed on inspection.  TVUS - Cervical length 3.09 to 3.5cm with no dynamic changes.    Discussed findings with Dr. Jules  Plan:  admit patient for observation.  For 24hr urine collection   Repeat pec labs in 6 hrs ( 1800)  For MFM Consult

## 2020-12-07 NOTE — OB RN TRIAGE NOTE - NS_FINALEDD_OBGYN_ALL_OB_DT
S/w pt and he states he removed his dressing since JL said it was ok to remove after 5 days, but there a little strips that look like tape over the incision and have some dried blood on it.  I advised that those are steri strips and should stay on until PO 28-Feb-2021

## 2020-12-07 NOTE — HISTORY OF PRESENT ILLNESS
[FreeTextEntry1] : CC: Hypothyroidism\par \par This is a 35-year-old female with subclinical hypothyroidism, hypertension, here for follow up.\par She is currently 28 weeks and 1 day pregnant.\par Blood work from July 22, 2020 showed TSH 7.03.  Free T4 was normal.  Repeat blood work on September 2, 2020 showed TSH 5.200, total T4 11.8. She was started on LT4 100mcg qd. She takes this in the morning on an empty stomach. \par There is no prior history of thyroid disease.  There is no history of thyroid disease in the family.\par She was started on Labetalol for HTN. \par LMP 5/24/2020\par TRAE 2/28/2021

## 2020-12-07 NOTE — OB PROVIDER H&P - ASSESSMENT
AP Course  Patient is a known chronic hypertensive; on Nifedipine 30er daily.  Found to have elevated B/Ps in the office on 12/3.  Was evaluated in triage and discharged home with Labetalol 200mg bid.  Last dose of labetalol taken 2300.    Discussed patients with Dr. Jules  Plan:  admit patient for observation.  For 24hr urine collection   Repeat pec labs in 6 hrs ( 1800)  Continue home med regimen.  For MFM Consult

## 2020-12-07 NOTE — OB PROVIDER H&P - HISTORY OF PRESENT ILLNESS
sent from Dr Scanlon's office elevated /100 140/96 , siPEC   denies any headache visual disturbances or right upper epigastric pain     Patient is a 34y/o  @ 28 1/7wks gestation who was sent to triage from the office to r/o pec.    Denies headache visual disturbances, nausea, vomiting or RUQ/Epigastric pain.  Denies contractions, loss of fluid or vaginal bleeding.  Reports good fetal movements.    AP Course:  Patient is a known chronic  hypertensive; on Nifedipine 30er daily.  Found to have elevated B/Ps in the office on 12/3.  Was evaluated in triage and discharged home with Labetalol 200mg bid.  Meds - pnv, levothyroxine 100mcg daily, Nifedipine 30ER daily, & Labetalol 200mg bid, ASA 81mg daily.  NKDA

## 2020-12-08 ENCOUNTER — NON-APPOINTMENT (OUTPATIENT)
Age: 35
End: 2020-12-08

## 2020-12-08 ENCOUNTER — OUTPATIENT (OUTPATIENT)
Dept: OUTPATIENT SERVICES | Facility: HOSPITAL | Age: 35
LOS: 1 days | End: 2020-12-08

## 2020-12-08 ENCOUNTER — TRANSCRIPTION ENCOUNTER (OUTPATIENT)
Age: 35
End: 2020-12-08

## 2020-12-08 ENCOUNTER — ASOB RESULT (OUTPATIENT)
Age: 35
End: 2020-12-08

## 2020-12-08 ENCOUNTER — APPOINTMENT (OUTPATIENT)
Dept: ANTEPARTUM | Facility: HOSPITAL | Age: 35
End: 2020-12-08
Payer: COMMERCIAL

## 2020-12-08 VITALS — SYSTOLIC BLOOD PRESSURE: 135 MMHG | DIASTOLIC BLOOD PRESSURE: 82 MMHG | HEART RATE: 81 BPM

## 2020-12-08 DIAGNOSIS — I10 ESSENTIAL (PRIMARY) HYPERTENSION: ICD-10-CM

## 2020-12-08 DIAGNOSIS — O10.019 PRE-EXISTING ESSENTIAL HYPERTENSION COMPLICATING PREGNANCY, UNSPECIFIED TRIMESTER: ICD-10-CM

## 2020-12-08 DIAGNOSIS — Z98.890 OTHER SPECIFIED POSTPROCEDURAL STATES: Chronic | ICD-10-CM

## 2020-12-08 LAB
BASOPHILS # BLD AUTO: 0.05 K/UL — SIGNIFICANT CHANGE UP (ref 0–0.2)
BASOPHILS NFR BLD AUTO: 0.5 % — SIGNIFICANT CHANGE UP (ref 0–2)
COLLECT DURATION TIME UR: 24 HR — SIGNIFICANT CHANGE UP
EOSINOPHIL # BLD AUTO: 0.05 K/UL — SIGNIFICANT CHANGE UP (ref 0–0.5)
EOSINOPHIL NFR BLD AUTO: 0.5 % — SIGNIFICANT CHANGE UP (ref 0–6)
HCT VFR BLD CALC: 37.4 % — SIGNIFICANT CHANGE UP (ref 34.5–45)
HGB BLD-MCNC: 12.6 G/DL — SIGNIFICANT CHANGE UP (ref 11.5–15.5)
IANC: 9.14 K/UL — HIGH (ref 1.5–8.5)
IMM GRANULOCYTES NFR BLD AUTO: 0.8 % — SIGNIFICANT CHANGE UP (ref 0–1.5)
LYMPHOCYTES # BLD AUTO: 1.01 K/UL — SIGNIFICANT CHANGE UP (ref 1–3.3)
LYMPHOCYTES # BLD AUTO: 9.1 % — LOW (ref 13–44)
MCHC RBC-ENTMCNC: 29.4 PG — SIGNIFICANT CHANGE UP (ref 27–34)
MCHC RBC-ENTMCNC: 33.7 GM/DL — SIGNIFICANT CHANGE UP (ref 32–36)
MCV RBC AUTO: 87.4 FL — SIGNIFICANT CHANGE UP (ref 80–100)
MONOCYTES # BLD AUTO: 0.74 K/UL — SIGNIFICANT CHANGE UP (ref 0–0.9)
MONOCYTES NFR BLD AUTO: 6.7 % — SIGNIFICANT CHANGE UP (ref 2–14)
NEUTROPHILS # BLD AUTO: 9.14 K/UL — HIGH (ref 1.8–7.4)
NEUTROPHILS NFR BLD AUTO: 82.4 % — HIGH (ref 43–77)
NRBC # BLD: 0 /100 WBCS — SIGNIFICANT CHANGE UP
NRBC # FLD: 0 K/UL — SIGNIFICANT CHANGE UP
PLATELET # BLD AUTO: 256 K/UL — SIGNIFICANT CHANGE UP (ref 150–400)
RBC # BLD: 4.28 M/UL — SIGNIFICANT CHANGE UP (ref 3.8–5.2)
RBC # FLD: 13.7 % — SIGNIFICANT CHANGE UP (ref 10.3–14.5)
SARS-COV-2 IGG SERPL QL IA: NEGATIVE — SIGNIFICANT CHANGE UP
SARS-COV-2 IGM SERPL IA-ACNC: 0.09 INDEX — SIGNIFICANT CHANGE UP
T PALLIDUM AB TITR SER: NEGATIVE — SIGNIFICANT CHANGE UP
T4 FREE SERPL-MCNC: 1.6 NG/DL
TOTAL VOLUME - 24 HOUR: 4275 ML — SIGNIFICANT CHANGE UP
TSH SERPL-ACNC: 0.23 UIU/ML
WBC # BLD: 11.08 K/UL — HIGH (ref 3.8–10.5)
WBC # FLD AUTO: 11.08 K/UL — HIGH (ref 3.8–10.5)

## 2020-12-08 PROCEDURE — 76819 FETAL BIOPHYS PROFIL W/O NST: CPT | Mod: 26

## 2020-12-08 PROCEDURE — 99232 SBSQ HOSP IP/OBS MODERATE 35: CPT

## 2020-12-08 RX ORDER — FOLIC ACID 0.8 MG
1 TABLET ORAL DAILY
Refills: 0 | Status: DISCONTINUED | OUTPATIENT
Start: 2020-12-08 | End: 2020-12-08

## 2020-12-08 RX ORDER — NIFEDIPINE 30 MG
1 TABLET, EXTENDED RELEASE 24 HR ORAL
Qty: 0 | Refills: 0 | DISCHARGE
Start: 2020-12-08

## 2020-12-08 RX ORDER — FERROUS SULFATE 325(65) MG
325 TABLET ORAL DAILY
Refills: 0 | Status: DISCONTINUED | OUTPATIENT
Start: 2020-12-08 | End: 2020-12-08

## 2020-12-08 RX ORDER — NIFEDIPINE 30 MG
1 TABLET, EXTENDED RELEASE 24 HR ORAL
Qty: 30 | Refills: 0
Start: 2020-12-08 | End: 2021-01-06

## 2020-12-08 RX ORDER — FOLIC ACID 0.8 MG
1 TABLET ORAL
Qty: 0 | Refills: 0 | DISCHARGE
Start: 2020-12-08

## 2020-12-08 RX ORDER — SENNA PLUS 8.6 MG/1
1 TABLET ORAL DAILY
Refills: 0 | Status: DISCONTINUED | OUTPATIENT
Start: 2020-12-08 | End: 2020-12-08

## 2020-12-08 RX ORDER — NIFEDIPINE 30 MG
1 TABLET, EXTENDED RELEASE 24 HR ORAL
Qty: 0 | Refills: 0 | DISCHARGE

## 2020-12-08 RX ORDER — ASPIRIN/CALCIUM CARB/MAGNESIUM 324 MG
1 TABLET ORAL
Qty: 0 | Refills: 0 | DISCHARGE
Start: 2020-12-08

## 2020-12-08 RX ADMIN — Medication 1 TABLET(S): at 12:21

## 2020-12-08 RX ADMIN — Medication 100 MICROGRAM(S): at 05:56

## 2020-12-08 RX ADMIN — Medication 200 MILLIGRAM(S): at 05:56

## 2020-12-08 RX ADMIN — Medication 81 MILLIGRAM(S): at 12:21

## 2020-12-08 RX ADMIN — Medication 1 MILLIGRAM(S): at 12:21

## 2020-12-08 NOTE — DISCHARGE NOTE ANTEPARTUM - MEDICATION SUMMARY - MEDICATIONS TO TAKE
I will START or STAY ON the medications listed below when I get home from the hospital:    aspirin 81 mg oral tablet, chewable  -- 1 tab(s) by mouth once a day  -- Indication: For ChTN    labetalol 200 mg oral tablet  -- 1 tab(s) by mouth 2 times a day  -- Indication: For Chronic hypertension    NIFEdipine 30 mg oral tablet, extended release  -- 1 tab(s) by mouth once a day  -- Indication: For Chronic hypertension    Prenatal Multivitamins with Folic Acid 1 mg oral tablet  -- 1 tab(s) by mouth once a day  -- Indication: For pregnancy    levothyroxine 100 mcg (0.1 mg) oral tablet  -- 1 tab(s) by mouth once a day  -- Indication: For hypothyroid    folic acid 1 mg oral tablet  -- 1 tab(s) by mouth once a day  -- Indication: For pregnancy

## 2020-12-08 NOTE — CONSULT NOTE ADULT - ATTENDING COMMENTS
MFM  Chronic hypertension exacerbation is common at this time in gestation. Patient is asymptomatic and blood pressures are overall improved with the new regimen. Patient should start  testing in the 3rd trimester and deliver 38-39.6 weeks due to CHTN on meds.  Dr. Betancourt

## 2020-12-08 NOTE — PROGRESS NOTE ADULT - SUBJECTIVE AND OBJECTIVE BOX
R3 Antepartum Progress Note: HD#2     Patient seen and examined at bedside, no acute overnight events. No acute complaints. Patient is ambulating, voiding spontaneously and tolerating regular diet. Denies ctx, LOF, VB and endorses good FM. Denies HA, changes in vision, CP, SOB, epigastric pain, RUQ pain, N/V.     Vital Signs Last 24 Hours  T(C): 36.8 (12-08-20 @ 01:40), Max: 37.1 (12-07-20 @ 22:25)  HR: 84 (12-08-20 @ 01:40) (81 - 99)  BP: 132/81 (12-08-20 @ 01:40) (121/76 - 156/72)  RR: 17 (12-08-20 @ 01:40) (16 - 17)  SpO2: 98% (12-08-20 @ 01:40) (97% - 100%)      Physical Exam:  General: NADm AOx3   Abdomen: Soft, gravid, non-tender, non-distended  Ext: No pain or swelling in LE b/l       Labs:             12.6   11.08<H> )-----------( 256      ( 12-08 @ 03:13 )             37.4                12.2   11.86<H> )-----------( 265      ( 12-07 @ 12:09 )             37.2                12.7   11.75<H> )-----------( 285      ( 12-03 @ 20:15 )             39.3         MEDICATIONS  (STANDING):  aspirin  chewable 81 milliGRAM(s) Oral daily  labetalol 200 milliGRAM(s) Oral every 12 hours  levothyroxine 100 MICROGram(s) Oral daily  NIFEdipine XL 30 milliGRAM(s) Oral daily

## 2020-12-08 NOTE — CONSULT NOTE ADULT - CONSULT REQUESTED DATE/TIME
HPI:  Brenton Lyman is a 82 year old female who presents today to follow up on :  1-diabetes mellitus: Current A1c 7.7%. She on insulin Lantus 34 units twice daily. If she goes up on this number sugar numbers drops to 100 and she becomes symptomatic.  2-right leg swelling: No erythema no pain. She has peripheral neuropathy that she takes gabapentin for. No injuries.  3-left toenail is slightly thick with some yellowish discoloration. No pain no skin swelling or redness.  4-right hand pain especially by the thumbs. Mostly her left hand. She recently she recently jammed it against the door while she was backing up with this scooter     Hemoglobin A1C (%)   Date Value   08/22/2019 7.7 (H)   04/18/2019 7.5 (H)   01/13/2019 8.4 (H)     Lab Results   Component Value Date    SODIUM 142 08/22/2019    POTASSIUM 4.9 08/22/2019    CHLORIDE 106 08/22/2019    CO2 29 08/22/2019    BUN 27 (H) 08/22/2019    CREATININE 1.01 (H) 08/22/2019    GLUCOSE 78 08/22/2019     WBC (K/mcL)   Date Value   08/22/2019 10.6     HCT (%)   Date Value   08/22/2019 35.2 (L)     HGB (g/dL)   Date Value   08/22/2019 11.3 (L)     PLT (K/mcL)   Date Value   08/22/2019 451 (H)   06/22/2013 371       Past Medical History   Past Medical History:   Diagnosis Date   • Anxiety    • Chronic back pain    • DDD (degenerative disc disease)     lumbar spine   • Diabetes mellitus (CMS/HCC)     Type 2   • Esophageal stricture    • Gastro-oesophageal reflux disease    • HTN (hypertension)    • Hypercholesterolemia    • Obesity    • Peptic ulcer    • Peripheral autonomic neuropathy due to diabetes mellitus (CMS/HCC)    • Reflux esophagitis    • Spinal stenosis of lumbar region        Allergy and  Meds reviewed today.      PHYSICAL EXAM   height is 5' 6\" (1.676 m). Her oral temperature is 98.1 °F (36.7 °C). Her blood pressure is 122/60 and her pulse is 95. Her respiration is 18 and oxygen saturation is 99%.  body mass index is 34.07 kg/m².    GENERAL:  Alert oriented  08-Dec-2020 well groomed patient in no apparent distress  HEENT:  Conjuctiva clear  no abnormal submandibular or cervical lymphadenopathy   NECK:  supple and nontender, without significant adenopathy,or thyromegaly  LUNG:  Normal chest wall excursion  COR:  Regular no murmurs.  EXT:  Mild edema in the right leg, varicose veins. No erythema no tenderness. Peripheral pulses present. Toenails lightly discolored, yellow.  Left hand no bruising no swelling mild tenderness with palpation at the base of the left thumb . Full range of motion    IMPRESSION/PLAN:  Type 2 diabetes mellitus with stage 2 chronic kidney disease, with long-term current use of insulin (CMS/[facility])  - PARATHYROID HORMONE INTACT WITHOUT CALCIUM; Future  - PHOSPHORUS; Future    Neuropathic pain    Right leg swelling    Nail discoloration    Left hand pain  Continue insulin at the current dose for diabetes. Continue monitoring diet for low carbohydrate. Recheck labs and follow-up in 3 months.  Continue gabapentin for neuropathy.  Wear pressure stocking for the legs to prevent swelling.  Use over-the-counter antifungal topical preparations for the nails.  Left hand pain most likely from the recent injury. Take ibuprofen as needed to stretching exercises follow-up if new symptoms.    Patient Instructions   Take OTC Vitamin D 3 = 2000 iu daily           Return in about 3 months (around 12/9/2019) for DM,, MWV .

## 2020-12-08 NOTE — DISCHARGE NOTE ANTEPARTUM - PATIENT PORTAL LINK FT
You can access the FollowMyHealth Patient Portal offered by [facility] by registering at the following website: [email or web address]. By joining [employer]’s FollowMyHealth portal, you will also be able to view your health information using other applications (apps) compatible with our system.

## 2020-12-08 NOTE — CONSULT NOTE ADULT - SUBJECTIVE AND OBJECTIVE BOX
Sent from Dr Scanlon's office for elevated /100 140/96 yesterday    Patient is a 36y/o  @ 28 1/7wks gestation who was sent to triage from the office to r/o pec.  Pt had BPs elevated to SBPs 130s-140s, DBPs 90-100s. At home runs lower, pt confirms that she routine checks BPs at home and they are in 120s-130s/80s.  Denies headache, visual disturbances, nausea, vomiting, SOB, CP, or RUQ/Epigastric pain.  Denies contractions, loss of fluid or vaginal bleeding.  Reports good fetal movements.    AP Course:  Patient is a known chronic  hypertensive; on Nifedipine 30er daily.  Found to have elevated B/Ps in the office on 12/3.  Was evaluated in triage and discharged home with Labetalol 200mg bid.  Meds - pnv, levothyroxine 100mcg daily, Nifedipine 30ER daily, & Labetalol 200mg bid, ASA 81mg daily.  NKDA    PAST MEDICAL & SURGICAL HISTORY:  Hypothyroidism, subclinical, dx during pregnancy, started on levothyroxine 100mcg  Chronic hypertension, dx  - s/p workup for etiology given early age of diagnosis. Negative renal sono, abdominal imaging. Given dx of essential HTN per pt  History of lumpectomy    MEDICATIONS  (STANDING):  aspirin  chewable 81 milliGRAM(s) Oral daily  folic acid 1 milliGRAM(s) Oral daily  labetalol 200 milliGRAM(s) Oral every 12 hours  levothyroxine 100 MICROGram(s) Oral daily  NIFEdipine XL 30 milliGRAM(s) Oral daily  prenatal multivitamin 1 Tablet(s) Oral daily      Vital Signs Last 24 Hrs  T(C): 36.6 (08 Dec 2020 09:59), Max: 37.1 (07 Dec 2020 22:25)  HR: 80 (08 Dec 2020 09:59) (80 - 99)  BP: 131/80 (08 Dec 2020 09:59) (122/78 - 156/72)  RR: 16 (08 Dec 2020 09:59) (16 - 17)  SpO2: 99% (08 Dec 2020 09:59) (97% - 100%)    PHYSICAL EXAM:    Constitutional:  NAD  Respiratory:  No resp distress  Cardiovascular:  RRR  Gastrointestinal:  Soft, NT, gravid  Genitourinary:  deferred  Extremities:  NT, non edematous, reflexes 2+ b/l at patella    LABORATORY:                        12.6   11.08 )-----------( 256      ( 08 Dec 2020 03:13 )             37.4   12-  135  |  102  |  5<L>  ----------------------------<  78  3.6   |  19<L>  |  0.58    Ca    9.1      07 Dec 2020 18:57  TPro  6.8  /  Alb  3.8  /  TBili  0.7  /  DBili  x   /  AST  17  /  ALT  12  /  AlkPhos  92  12-07  PT/INR - ( 07 Dec 2020 12:10 )   PT: 12.5 sec;   INR: 1.09 ratio    PTT - ( 07 Dec 2020 12:10 )  PTT:27.3 sec  Urinalysis Basic - ( 07 Dec 2020 12:10 )  Color: Light Yellow / Appearance: Clear / S.009 / pH: x  Gluc: x / Ketone: Trace  / Bili: Negative / Urobili: <2 mg/dL   Blood: x / Protein: Negative / Nitrite: Negative   Leuk Esterase: Negative / RBC: x / WBC x   Sq Epi: x / Non Sq Epi: x / Bacteria: x  P:C 0.13    FHT  145, mod, + accels, - decels, reactive tracing  toco no ctx today (rare ctx seen yesterday)

## 2020-12-08 NOTE — DISCHARGE NOTE ANTEPARTUM - HOSPITAL COURSE
36 yo  at [id number] admitted with TN for BP monitoring to r/o siPEC. Serial HELLP labs wnl. BPs well controlled on Labetalol 200 BID and Procardia 30XL. Patient asymptomatic with no acute complaints.  PC ratio: 0.13. BP WNL overnight and this morning on current BP medications. F/u 24 hr urine protein. ATU sono : BPP   Patient stable for discharge home with close outpatient follow up, home BP monitoring, and strict PEC precautions.

## 2020-12-08 NOTE — DISCHARGE NOTE ANTEPARTUM - CARE PROVIDER_API CALL
Josue Díaz)  Obstetrics and Gynecology  [address] [place] [address]  Phone: [phone]  Fax: [phone]  Follow Up Time:

## 2020-12-08 NOTE — DISCHARGE NOTE ANTEPARTUM - CARE PLAN
Principal Discharge DX:	Chronic hypertension  Goal:	Continue prenatal course  Assessment and plan of treatment:	- Follow up with OB within the next week  - Follow up for sonograms in office of with  testing unit  - Continue BP meds as prescribed (hold is BP is under 110/60 or HR is under 60)  -Take blood pressure with at home cuff prior to taking medications; if BP is >150/90 call MD  - Return to hospital with headaches, visual changes, abdominal pain, nausea, vomiting, chest pain or shortness of breathe  - Return with contractions, vaginal bleeding, leaking fluid or decreased fetal movment

## 2020-12-08 NOTE — DISCHARGE NOTE ANTEPARTUM - PLAN OF CARE
Continue prenatal course - Follow up with OB within the next week  - Follow up for sonograms in office of with  testing unit  - Continue BP meds as prescribed (hold is BP is under 110/60 or HR is under 60)  -Take blood pressure with at home cuff prior to taking medications; if BP is >150/90 call MD  - Return to hospital with headaches, visual changes, abdominal pain, nausea, vomiting, chest pain or shortness of breathe  - Return with contractions, vaginal bleeding, leaking fluid or decreased fetal movment

## 2020-12-08 NOTE — PROGRESS NOTE ADULT - ASSESSMENT
36 yo  at [id number] admitted with cHTN for BP monitoring to r/o siPEC. Serial HELLP labs wnl. BPs well controlled on Labetalol 200 BID and Procardia 30XL. Patient asymptomatic with no acute complaints.       cHTN  - Continue with ASA, Labetalol 200 BID, Procardia 30XL   - F/u 24 hr urine protein   - HELLP labs wnl x2 ()   - Continue to monitor BPs closely     #Fetal wellbeing  - NST BID  - AM ATU sono   - ATU sono (): Vtx, ant placenta, MVP 5.84, EFW 893g (27%), BPP 8/8, post VEGA fibroid 11.6 x 8.94 x 9.61     #Maternal wellbeing  - c/w home synthroid   - reg diet   - PNV, folic acid, senna    - Ambulation, venodynes for DVT ppx

## 2020-12-08 NOTE — CONSULT NOTE ADULT - ASSESSMENT
This is a 36yo  @ 28+2 WGA w cHTN since   New BP elevations most likely due to routine physiologic elevation of BPs in 3rd trimester of pregnancy. Low concern for PEC at this time given normal labs, P:C 0.13, and pt is asymptomatic.   If persistently in SBPs 150s or DBPs 100s, would consider increasing nifedipine XL to 60mg daily, however may stay on current nifedipine and labetalol regimen for now.  Pt describes well controlled blood pressures at home, only elevated (but not to severe range) in the office. Normal home ambulatory testing suggest an element of white coat hypertension. As long as BPs return to normal (or the patient's baseline elevation), it is okay to continue on current regimen, only increasing for persistent elevations.  Persistent elevations or onset of new symptoms would be an indication for repeat evalaution for siPEC.    Given BP control currently achieved, would target 38+0 to 39+6 WGA for delivery    Continue scheduled antepartum testing (starting at 32 weeks) and ultrasound growth evaluations for cHTN (q4 weeks)    MD JIM PeresM Fellow    seen and evalauted w MARYAN Irby attg

## 2020-12-09 LAB — PROT 24H UR-MRATE: SIGNIFICANT CHANGE UP MG/24 H

## 2020-12-22 ENCOUNTER — APPOINTMENT (OUTPATIENT)
Dept: ANTEPARTUM | Facility: CLINIC | Age: 35
End: 2020-12-22
Payer: COMMERCIAL

## 2020-12-22 ENCOUNTER — ASOB RESULT (OUTPATIENT)
Age: 35
End: 2020-12-22

## 2020-12-22 PROCEDURE — 99214 OFFICE O/P EST MOD 30 MIN: CPT | Mod: 95

## 2020-12-23 ENCOUNTER — ASOB RESULT (OUTPATIENT)
Age: 35
End: 2020-12-23

## 2020-12-23 ENCOUNTER — APPOINTMENT (OUTPATIENT)
Dept: ANTEPARTUM | Facility: CLINIC | Age: 35
End: 2020-12-23
Payer: COMMERCIAL

## 2020-12-23 PROCEDURE — 99072 ADDL SUPL MATRL&STAF TM PHE: CPT

## 2020-12-23 PROCEDURE — 76819 FETAL BIOPHYS PROFIL W/O NST: CPT

## 2020-12-23 PROCEDURE — 76816 OB US FOLLOW-UP PER FETUS: CPT

## 2021-01-07 ENCOUNTER — OUTPATIENT (OUTPATIENT)
Dept: INPATIENT UNIT | Facility: HOSPITAL | Age: 36
LOS: 1 days | Discharge: HOME | End: 2021-01-07
Payer: COMMERCIAL

## 2021-01-07 VITALS
RESPIRATION RATE: 16 BRPM | SYSTOLIC BLOOD PRESSURE: 144 MMHG | HEART RATE: 75 BPM | DIASTOLIC BLOOD PRESSURE: 91 MMHG | TEMPERATURE: 99 F

## 2021-01-07 VITALS — DIASTOLIC BLOOD PRESSURE: 68 MMHG | HEART RATE: 65 BPM | SYSTOLIC BLOOD PRESSURE: 113 MMHG

## 2021-01-07 DIAGNOSIS — Z3A.00 WEEKS OF GESTATION OF PREGNANCY NOT SPECIFIED: ICD-10-CM

## 2021-01-07 DIAGNOSIS — Z98.890 OTHER SPECIFIED POSTPROCEDURAL STATES: Chronic | ICD-10-CM

## 2021-01-07 DIAGNOSIS — O26.899 OTHER SPECIFIED PREGNANCY RELATED CONDITIONS, UNSPECIFIED TRIMESTER: ICD-10-CM

## 2021-01-07 LAB
ALBUMIN SERPL ELPH-MCNC: 3.5 G/DL — SIGNIFICANT CHANGE UP (ref 3.3–5)
ALP SERPL-CCNC: 109 U/L — SIGNIFICANT CHANGE UP (ref 40–120)
ALT FLD-CCNC: 10 U/L — SIGNIFICANT CHANGE UP (ref 4–33)
ANION GAP SERPL CALC-SCNC: 10 MMOL/L — SIGNIFICANT CHANGE UP (ref 7–14)
APPEARANCE UR: CLEAR — SIGNIFICANT CHANGE UP
APTT BLD: 26.7 SEC — LOW (ref 27–36.3)
AST SERPL-CCNC: 14 U/L — SIGNIFICANT CHANGE UP (ref 4–32)
BASOPHILS # BLD AUTO: 0.04 K/UL — SIGNIFICANT CHANGE UP (ref 0–0.2)
BASOPHILS NFR BLD AUTO: 0.4 % — SIGNIFICANT CHANGE UP (ref 0–2)
BILIRUB SERPL-MCNC: 0.4 MG/DL — SIGNIFICANT CHANGE UP (ref 0.2–1.2)
BILIRUB UR-MCNC: NEGATIVE — SIGNIFICANT CHANGE UP
BUN SERPL-MCNC: 7 MG/DL — SIGNIFICANT CHANGE UP (ref 7–23)
CALCIUM SERPL-MCNC: 9.2 MG/DL — SIGNIFICANT CHANGE UP (ref 8.4–10.5)
CHLORIDE SERPL-SCNC: 104 MMOL/L — SIGNIFICANT CHANGE UP (ref 98–107)
CO2 SERPL-SCNC: 22 MMOL/L — SIGNIFICANT CHANGE UP (ref 22–31)
COLOR SPEC: SIGNIFICANT CHANGE UP
CREAT ?TM UR-MCNC: 64 MG/DL — SIGNIFICANT CHANGE UP
CREAT SERPL-MCNC: 0.62 MG/DL — SIGNIFICANT CHANGE UP (ref 0.5–1.3)
DIFF PNL FLD: NEGATIVE — SIGNIFICANT CHANGE UP
EOSINOPHIL # BLD AUTO: 0.1 K/UL — SIGNIFICANT CHANGE UP (ref 0–0.5)
EOSINOPHIL NFR BLD AUTO: 1.1 % — SIGNIFICANT CHANGE UP (ref 0–6)
FIBRINOGEN PPP-MCNC: 630 MG/DL — HIGH (ref 290–520)
GLUCOSE SERPL-MCNC: 82 MG/DL — SIGNIFICANT CHANGE UP (ref 70–99)
GLUCOSE UR QL: NEGATIVE — SIGNIFICANT CHANGE UP
HCT VFR BLD CALC: 35.1 % — SIGNIFICANT CHANGE UP (ref 34.5–45)
HGB BLD-MCNC: 11.6 G/DL — SIGNIFICANT CHANGE UP (ref 11.5–15.5)
IANC: 7.36 K/UL — SIGNIFICANT CHANGE UP (ref 1.5–8.5)
IMM GRANULOCYTES NFR BLD AUTO: 1.1 % — SIGNIFICANT CHANGE UP (ref 0–1.5)
INR BLD: 1.06 RATIO — SIGNIFICANT CHANGE UP (ref 0.88–1.16)
KETONES UR-MCNC: NEGATIVE — SIGNIFICANT CHANGE UP
LDH SERPL L TO P-CCNC: 168 U/L — SIGNIFICANT CHANGE UP (ref 135–225)
LEUKOCYTE ESTERASE UR-ACNC: NEGATIVE — SIGNIFICANT CHANGE UP
LYMPHOCYTES # BLD AUTO: 1.17 K/UL — SIGNIFICANT CHANGE UP (ref 1–3.3)
LYMPHOCYTES # BLD AUTO: 12.4 % — LOW (ref 13–44)
MCHC RBC-ENTMCNC: 28.6 PG — SIGNIFICANT CHANGE UP (ref 27–34)
MCHC RBC-ENTMCNC: 33 GM/DL — SIGNIFICANT CHANGE UP (ref 32–36)
MCV RBC AUTO: 86.7 FL — SIGNIFICANT CHANGE UP (ref 80–100)
MONOCYTES # BLD AUTO: 0.68 K/UL — SIGNIFICANT CHANGE UP (ref 0–0.9)
MONOCYTES NFR BLD AUTO: 7.2 % — SIGNIFICANT CHANGE UP (ref 2–14)
NEUTROPHILS # BLD AUTO: 7.36 K/UL — SIGNIFICANT CHANGE UP (ref 1.8–7.4)
NEUTROPHILS NFR BLD AUTO: 77.8 % — HIGH (ref 43–77)
NITRITE UR-MCNC: NEGATIVE — SIGNIFICANT CHANGE UP
NRBC # BLD: 0 /100 WBCS — SIGNIFICANT CHANGE UP
NRBC # FLD: 0 K/UL — SIGNIFICANT CHANGE UP
PH UR: 7 — SIGNIFICANT CHANGE UP (ref 5–8)
PLATELET # BLD AUTO: 261 K/UL — SIGNIFICANT CHANGE UP (ref 150–400)
POTASSIUM SERPL-MCNC: 3.7 MMOL/L — SIGNIFICANT CHANGE UP (ref 3.5–5.3)
POTASSIUM SERPL-SCNC: 3.7 MMOL/L — SIGNIFICANT CHANGE UP (ref 3.5–5.3)
PROT ?TM UR-MCNC: 8 MG/DL — SIGNIFICANT CHANGE UP
PROT ?TM UR-MCNC: 8 MG/DL — SIGNIFICANT CHANGE UP
PROT SERPL-MCNC: 6.7 G/DL — SIGNIFICANT CHANGE UP (ref 6–8.3)
PROT UR-MCNC: NEGATIVE — SIGNIFICANT CHANGE UP
PROT/CREAT UR-RTO: 0.1 RATIO — SIGNIFICANT CHANGE UP (ref 0–0.2)
PROTHROM AB SERPL-ACNC: 12 SEC — SIGNIFICANT CHANGE UP (ref 10.6–13.6)
RBC # BLD: 4.05 M/UL — SIGNIFICANT CHANGE UP (ref 3.8–5.2)
RBC # FLD: 13.8 % — SIGNIFICANT CHANGE UP (ref 10.3–14.5)
SODIUM SERPL-SCNC: 136 MMOL/L — SIGNIFICANT CHANGE UP (ref 135–145)
SP GR SPEC: 1.01 — SIGNIFICANT CHANGE UP (ref 1.01–1.02)
URATE SERPL-MCNC: 3.9 MG/DL — SIGNIFICANT CHANGE UP (ref 2.5–7)
UROBILINOGEN FLD QL: SIGNIFICANT CHANGE UP
WBC # BLD: 9.45 K/UL — SIGNIFICANT CHANGE UP (ref 3.8–10.5)
WBC # FLD AUTO: 9.45 K/UL — SIGNIFICANT CHANGE UP (ref 3.8–10.5)

## 2021-01-07 PROCEDURE — 76818 FETAL BIOPHYS PROFILE W/NST: CPT | Mod: 26

## 2021-01-07 PROCEDURE — 76816 OB US FOLLOW-UP PER FETUS: CPT | Mod: 26

## 2021-01-07 PROCEDURE — 99214 OFFICE O/P EST MOD 30 MIN: CPT | Mod: 25

## 2021-01-07 RX ORDER — NIFEDIPINE 30 MG
30 TABLET, EXTENDED RELEASE 24 HR ORAL ONCE
Refills: 0 | Status: COMPLETED | OUTPATIENT
Start: 2021-01-07 | End: 2021-01-07

## 2021-01-07 RX ORDER — LABETALOL HCL 100 MG
300 TABLET ORAL
Qty: 0 | Refills: 0 | DISCHARGE

## 2021-01-07 RX ORDER — LABETALOL HCL 100 MG
1 TABLET ORAL
Qty: 0 | Refills: 0 | DISCHARGE

## 2021-01-07 RX ORDER — LABETALOL HCL 100 MG
300 TABLET ORAL ONCE
Refills: 0 | Status: COMPLETED | OUTPATIENT
Start: 2021-01-07 | End: 2021-01-07

## 2021-01-07 RX ORDER — LEVOTHYROXINE SODIUM 125 MCG
1 TABLET ORAL
Qty: 0 | Refills: 0 | DISCHARGE

## 2021-01-07 RX ADMIN — Medication 300 MILLIGRAM(S): at 21:35

## 2021-01-07 RX ADMIN — Medication 30 MILLIGRAM(S): at 21:35

## 2021-01-07 NOTE — OB PROVIDER TRIAGE NOTE - HISTORY OF PRESENT ILLNESS
[facility] Provider Dr Díaz.  EDC:  2021.    Patient is a 34y/o  @ 344/7wks gestation who was sent in, from the office, for prolonged monitoring.    Patient found to have a non reactive NST.    Denies lof, vaginal bleeding, or contractions.    Reports good fetal movements.    AP Course:  cHTN  Meds - pnv, nifedipine 30ER, Labetalol 300mg bid, & asa 81mg daily  NKDA

## 2021-01-07 NOTE — OB PROVIDER TRIAGE NOTE - NSOBPROVIDERNOTE_OBGYN_ALL_OB_FT
[facility] Provider Dr Díaz.  EDC:  2021.    Patient is a 34y/o  @ 344/7wks gestation who was sent in, from the office, for prolonged monitoring.    Patient found to have a non reactive NST.    Denies lof, vaginal bleeding, or contractions.    Reports good fetal movements.    AP Course:  cHTN  Meds - pnv, nifedipine 30mgER, Labetalol 300mg bid, & asa 81mg daily. ( labetalol last taken @ 1000 & nifedipine 10pm)  NKDA    PMH - CHTN           - Hypothyroidism  PSH - Lumpectomy; left benign mass - 2017  OB - present    Vital Signs Last 24 Hrs  T(C): 37 (2021 18:26), Max: 37 (2021 18:26)  T(F): 98.6 (2021 18:26), Max: 98.6 (2021 18:26)  HR: 75 (2021 18:52) (75 - 81)  BP: 146/- (2021 18:52) (144/91 - 159/97)  RR: 16 (2021 18:26) (16 - 16)    Abdomen gravid, soft and nontender  NST -  TAS -  BPP -    Report given to next tour. [facility] [place] Díaz.  EDC:  2021.    Patient is a 36y/o  @ 344/7wks gestation who was sent in, from the office, for prolonged monitoring.    Patient found to have a non reactive NST.    Denies lof, vaginal bleeding, or contractions.    Reports good fetal movements.    AP Course:  cHTN  Meds - pnv, nifedipine 30mgER, Labetalol 300mg bid, & asa 81mg daily. ( labetalol last taken @ 1000 & nifedipine 10pm)  NKDA    PMH - CHTN           - Hypothyroidism  PSH - Lumpectomy; left benign mass -   OB - present  GYN - non obstructing uterine fibroids    Vital Signs Last 24 Hrs  T(C): 37 (2021 18:26), Max: 37 (2021 18:26)  T(F): 98.6 (2021 18:26), Max: 98.6 (2021 18:26)  HR: 75 (2021 18:52) (75 - 81)  BP: 146/- (2021 18:52) (144/91 - 159/97)  RR: 16 (2021 18:26) (16 - 16)    Abdomen gravid, soft and nontender  NST -  TAS -  BPP -    Report given to next tour. [facility] Provider Dr Díaz.  EDC:  2021.    Patient is a 36y/o  @ 344/7wks gestation who was sent in, from the office, for prolonged monitoring.    Patient found to have a non reactive NST.    Denies lof, vaginal bleeding, or contractions.    Reports good fetal movements.  Denies h/a, visual disturbances, n/v or ruq/epigastric pain.    AP Course:  cHTN  Meds - pnv, levthyroxine 100mcg daily, nifedipine 30mgER, Labetalol 300mg bid, & asa 81mg daily. ( labetalol last taken @ 1000 & nifedipine 10pm)  NKDA    PMH - CHTN           - Hypothyroidism  PSH - Lumpectomy; left benign mass -   OB - present  GYN - non obstructing uterine fibroids    Vital Signs Last 24 Hrs  T(C): 37 (2021 18:26), Max: 37 (2021 18:26)  T(F): 98.6 (2021 18:26), Max: 98.6 (2021 18:26)  HR: 75 (2021 18:52) (75 - 81)  BP: 146/- (2021 18:52) (144/91 - 159/97)  RR: 16 (2021 18:26) (16 - 16)    Abdomen gravid, soft and nontender  NST -  TAS -  BPP -  Plan:  continue to monitor.  PEC Labs ordered    Report given to next tour. [facility] Provider Dr Díaz.  EDC:  2021.    Patient is a 36y/o  @ 344/7wks gestation who was sent in, from the office, for prolonged monitoring.    Patient found to have a non reactive NST.    Denies lof, vaginal bleeding, or contractions.    Reports good fetal movements.  Denies h/a, visual disturbances, n/v or ruq/epigastric pain.    AP Course:  cHTN  Meds - pnv, levthyroxine 100mcg daily, nifedipine 30mgER, Labetalol 300mg bid, & asa 81mg daily. ( labetalol last taken @ 1000 & nifedipine 10pm)  NKDA    PMH - CHTN           - Hypothyroidism  PSH - Lumpectomy; left benign mass -   OB - present  GYN - non obstructing uterine fibroids    Vital Signs Last 24 Hrs  T(C): 37 (2021 18:26), Max: 37 (2021 18:26)  T(F): 98.6 (2021 18:26), Max: 98.6 (2021 18:26)  HR: 75 (2021 18:52) (75 - 81)  BP: 146/- (2021 18:52) (144/91 - 159/97)  RR: 16 (2021 18:26) (16 - 16)    Abdomen gravid, soft and nontender  NST -  TAS -  BPP -  Plan:  continue to monitor.  PEC Labs ordered    Report given to next tour.    @0    NST reactive with moderate variability, cat 1  toco no ctx noted  Plan:  -PEC labs pending  -will continue to monitor patient     @  NST reactive with moderate variability, cat 1  toco ctx 3-4 minutes  patient denies feeling any contractions     d/w with Dr Blanc  Plan:  -TVS not warranted at this time, SVE and will repeat in one hour for any cervical change  -will continue to monitor patient    @  SVE: closed/50/-3 [facility] Provider Dr Díaz.  EDC:  2021.    Patient is a 34y/o  @ 344/7wks gestation who was sent in, from the office, for prolonged monitoring.    Patient found to have a non reactive NST.    Denies lof, vaginal bleeding, or contractions.    Reports good fetal movements.  Denies h/a, visual disturbances, n/v or ruq/epigastric pain.    AP Course:  cHTN  Meds - pnv, levthyroxine 100mcg daily, nifedipine 30mgER, Labetalol 300mg bid, & asa 81mg daily. ( labetalol last taken @ 1000 & nifedipine 10pm)  NKDA    PMH - CHTN           - Hypothyroidism  PSH - Lumpectomy; left benign mass -   OB - present  GYN - non obstructing uterine fibroids    Vital Signs Last 24 Hrs  T(C): 37 (2021 18:26), Max: 37 (2021 18:26)  T(F): 98.6 (2021 18:26), Max: 98.6 (2021 18:26)  HR: 75 (2021 18:52) (75 - 81)  BP: 146/- (2021 18:52) (144/91 - 159/97)  RR: 16 (2021 18:26) (16 - 16)    Abdomen gravid, soft and nontender  NST -  TAS -  BPP -  Plan:  continue to monitor.  PEC Labs ordered    Report given to next tour.    @    NST reactive with moderate variability, cat 1  toco no ctx noted  Plan:  -PEC labs pending  -will continue to monitor patient     @  NST reactive with moderate variability, cat 1  toco ctx 3-4 minutes  patient denies feeling any contractions     d/w with Dr Blanc  Plan:  -TVS not warranted at this time, SVE and will repeat in one hour for any cervical change  -will continue to monitor patient    @  SVE: closed/50/-3    @  TAS: BPP , AJ 10.58cm, vertex presentation, anterior placenta   Repeat SVE: closed/50/-3 [facility] Provider Dr Díaz.  EDC:  2021.    Patient is a 36y/o  @ 344/7wks gestation who was sent in, from the office, for prolonged monitoring.    Patient found to have a non reactive NST.    Denies lof, vaginal bleeding, or contractions.    Reports good fetal movements.  Denies h/a, visual disturbances, n/v or ruq/epigastric pain.    AP Course:  cHTN  Meds - pnv, levthyroxine 100mcg daily, nifedipine 30mgER, Labetalol 300mg bid, & asa 81mg daily. ( labetalol last taken @ 1000 & nifedipine 10pm)  NKDA    PMH - CHTN           - Hypothyroidism  PSH - Lumpectomy; left benign mass -   OB - present  GYN - non obstructing uterine fibroids    Vital Signs Last 24 Hrs  T(C): 37 (2021 18:26), Max: 37 (2021 18:26)  T(F): 98.6 (2021 18:26), Max: 98.6 (2021 18:26)  HR: 75 (2021 18:52) (75 - 81)  BP: 146/- (2021 18:52) (144/91 - 159/97)  RR: 16 (2021 18:26) (16 - 16)    Abdomen gravid, soft and nontender  NST -  TAS -  BPP -  Plan:  continue to monitor.  PEC Labs ordered    Report given to next tour.    @    NST reactive with moderate variability, cat 1  toco no ctx noted  Plan:  -PEC labs pending  -will continue to monitor patient     @  NST reactive with moderate variability, cat 1  toco ctx 3-4 minutes  patient denies feeling any contractions     d/w with Dr Blanc  Plan:  -TVS not warranted at this time, SVE and will repeat in one hour for any cervical change  -will continue to monitor patient    @  SVE: closed/50/-3  Plan:  -PO hydration  -will repeat VE in one hour     @  TAS: BPP , AJ 10.58cm, vertex presentation, anterior placenta   Repeat SVE: closed/50/-3    NST reactive with moderate variability, cat 1   toco irregular ctx 2-3 minutes  patient denies any discomfort    @  No evidence of  labor  No evidence of PEC   d/w with Dr Blanc  reviewed blood pressures   Plan:  -Patient cleared for discharge  -Patient to follow up in office on 2021   -Patient to continue home medications   -Signs and symptoms of preeclampsia reviewed with patient  - labor precautions reviewed with patient  -Fetal kick counts reviewed with patient   -Written and verbal instructions given to patient, patient verbalizes understanding

## 2021-01-07 NOTE — OB PROVIDER TRIAGE NOTE - NS_PLACENTALOCAL_OBGYN_ALL_OB
Problem: Goal Outcome Summary  Goal: Goal Outcome Summary  Outcome: No Change  Pt confuse/forgetful.  Afebrile.  VSS.  3 L NC.  Tylenol for incision/back pain.  Saline locked.  Incision ecchymotic/CDI.  Tele Afib cvr/ucvr.  .             Anterior

## 2021-01-07 NOTE — OB PROVIDER TRIAGE NOTE - NSHPLABSRESULTS_GEN_ALL_CORE
PEC LABS CBC Full  -  ( 2021 19:38 )  WBC Count : 9.45 K/uL  RBC Count : 4.05 M/uL  Hemoglobin : 11.6 g/dL  Hematocrit : 35.1 %  Platelet Count - Automated : 261 K/uL  Mean Cell Volume : 86.7 fL  Mean Cell Hemoglobin : 28.6 pg  Mean Cell Hemoglobin Concentration : 33.0 gm/dL  Auto Neutrophil # : 7.36 K/uL  Auto Lymphocyte # : 1.17 K/uL  Auto Monocyte # : 0.68 K/uL  Auto Eosinophil # : 0.10 K/uL  Auto Basophil # : 0.04 K/uL  Auto Neutrophil % : 77.8 %  Auto Lymphocyte % : 12.4 %  Auto Monocyte % : 7.2 %  Auto Eosinophil % : 1.1 %  Auto Basophil % : 0.4 %        136  |  104  |  7   ----------------------------<  82  3.7   |  22  |  0.62    Ca    9.2      2021 19:38    TPro  6.7  /  Alb  3.5  /  TBili  0.4  /  DBili  x   /  AST  14  /  ALT  10  /  AlkPhos  109  01-07    PT/INR - ( 2021 19:38 )   PT: 12.0 sec;   INR: 1.06 ratio         PTT - ( 2021 19:38 )  PTT:26.7 sec    Fibrinogen: 630    Urinalysis Basic - ( 2021 19:38 )    Color: Light Yellow / Appearance: Clear / S.011 / pH: x  Gluc: x / Ketone: Negative  / Bili: Negative / Urobili: <2 mg/dL   Blood: x / Protein: Negative / Nitrite: Negative   Leuk Esterase: Negative / RBC: x / WBC x   Sq Epi: x / Non Sq Epi: x / Bacteria: x CBC Full  -  ( 2021 19:38 )  WBC Count : 9.45 K/uL  RBC Count : 4.05 M/uL  Hemoglobin : 11.6 g/dL  Hematocrit : 35.1 %  Platelet Count - Automated : 261 K/uL  Mean Cell Volume : 86.7 fL  Mean Cell Hemoglobin : 28.6 pg  Mean Cell Hemoglobin Concentration : 33.0 gm/dL  Auto Neutrophil # : 7.36 K/uL  Auto Lymphocyte # : 1.17 K/uL  Auto Monocyte # : 0.68 K/uL  Auto Eosinophil # : 0.10 K/uL  Auto Basophil # : 0.04 K/uL  Auto Neutrophil % : 77.8 %  Auto Lymphocyte % : 12.4 %  Auto Monocyte % : 7.2 %  Auto Eosinophil % : 1.1 %  Auto Basophil % : 0.4 %        136  |  104  |  7   ----------------------------<  82  3.7   |  22  |  0.62    Ca    9.2      2021 19:38    TPro  6.7  /  Alb  3.5  /  TBili  0.4  /  DBili  x   /  AST  14  /  ALT  10  /  AlkPhos  109  01-07    PT/INR - ( 2021 19:38 )   PT: 12.0 sec;   INR: 1.06 ratio         PTT - ( 2021 19:38 )  PTT:26.7 sec    Fibrinogen: 630    Urinalysis Basic - ( 2021 19:38 )    Color: Light Yellow / Appearance: Clear / S.011 / pH: x  Gluc: x / Ketone: Negative  / Bili: Negative / Urobili: <2 mg/dL   Blood: x / Protein: Negative / Nitrite: Negative   Leuk Esterase: Negative / RBC: x / WBC x   Sq Epi: x / Non Sq Epi: x / Bacteria: x    reviewed with Dr Blanc

## 2021-01-07 NOTE — OB PROVIDER TRIAGE NOTE - ADDITIONAL INSTRUCTIONS
-Patient to follow up in office on 2021   -Patient to continue home medications   -Signs and symptoms of preeclampsia reviewed with patient  - labor precautions reviewed with patient  -Fetal kick counts reviewed with patient   -Written and verbal instructions given to patient, patient verbalizes understanding

## 2021-01-11 ENCOUNTER — APPOINTMENT (OUTPATIENT)
Dept: ENDOCRINOLOGY | Facility: CLINIC | Age: 36
End: 2021-01-11
Payer: COMMERCIAL

## 2021-01-11 VITALS
SYSTOLIC BLOOD PRESSURE: 127 MMHG | HEART RATE: 86 BPM | OXYGEN SATURATION: 98 % | BODY MASS INDEX: 31.18 KG/M2 | HEIGHT: 64.96 IN | DIASTOLIC BLOOD PRESSURE: 80 MMHG | WEIGHT: 187.15 LBS

## 2021-01-11 LAB
T4 FREE SERPL-MCNC: 1.3 NG/DL
TSH SERPL-ACNC: 1.23 UIU/ML

## 2021-01-11 PROCEDURE — 99213 OFFICE O/P EST LOW 20 MIN: CPT | Mod: 25

## 2021-01-11 PROCEDURE — 36415 COLL VENOUS BLD VENIPUNCTURE: CPT

## 2021-01-11 PROCEDURE — 99072 ADDL SUPL MATRL&STAF TM PHE: CPT

## 2021-01-11 RX ORDER — LEVOTHYROXINE SODIUM 0.05 MG/1
50 TABLET ORAL DAILY
Qty: 90 | Refills: 2 | Status: COMPLETED | COMMUNITY
Start: 2021-01-11 | End: 2021-10-08

## 2021-01-12 ENCOUNTER — NON-APPOINTMENT (OUTPATIENT)
Age: 36
End: 2021-01-12

## 2021-01-13 NOTE — ASSESSMENT
[FreeTextEntry1] : This is a 35-year-old female with subclinical hypothyroidism who is currently 33 weeks and 1 day pregnant.\par Continue levothyroxine 100 mcg 6 days/week for now.\par Check TFTs. \par After delivery, decrease levothyroxine to 50 mcg daily.  Follow-up 6 weeks after delivery.\par

## 2021-01-13 NOTE — PHYSICAL EXAM
[Alert] : alert [Well Nourished] : well nourished [Healthy Appearance] : healthy appearance [No Acute Distress] : no acute distress [Well Developed] : well developed [Normal Voice/Communication] : normal voice communication [Normal Sclera/Conjunctiva] : normal sclera/conjunctiva [No Proptosis] : no proptosis [No Neck Mass] : no neck mass was observed [No LAD] : no lymphadenopathy [Supple] : the neck was supple [Thyroid Not Enlarged] : the thyroid was not enlarged [No Thyroid Nodules] : no palpable thyroid nodules [No Respiratory Distress] : no respiratory distress [No Accessory Muscle Use] : no accessory muscle use [Normal Rate and Effort] : normal respiratory rate and effort [Normal Rate] : heart rate was normal [No Edema] : no peripheral edema [Normal Gait] : normal gait [No Clubbing, Cyanosis] : no clubbing  or cyanosis of the fingernails [No Involuntary Movements] : no involuntary movements were seen [Acanthosis Nigricans] : no acanthosis nigricans [Hirsutism] : no hirsutism [No Tremors] : no tremors [Normal Affect] : the affect was normal [Normal Insight/Judgement] : insight and judgment were intact [Normal Mood] : the mood was normal [de-identified] : gravid

## 2021-01-13 NOTE — HISTORY OF PRESENT ILLNESS
[FreeTextEntry1] : CC: Hypothyroidism\par \par This is a 35-year-old female with subclinical hypothyroidism, hypertension, here for follow up.\par She is currently 33 weeks and 1 day pregnant.\par Blood work from July 22, 2020 showed TSH 7.03.  Free T4 was normal.  Repeat blood work on September 2, 2020 showed TSH 5.200, total T4 11.8. She was started on LT4 100mcg qd.  She is now on levothyroxine 100 mcg 6 days/week.  She takes this in the morning on an empty stomach. \par There is no prior history of thyroid disease.  There is no history of thyroid disease in the family.\par LMP 5/24/2020\par TRAE 2/28/2021

## 2021-01-20 ENCOUNTER — APPOINTMENT (OUTPATIENT)
Dept: ANTEPARTUM | Facility: CLINIC | Age: 36
End: 2021-01-20
Payer: COMMERCIAL

## 2021-01-20 ENCOUNTER — ASOB RESULT (OUTPATIENT)
Age: 36
End: 2021-01-20

## 2021-01-20 PROCEDURE — 99072 ADDL SUPL MATRL&STAF TM PHE: CPT

## 2021-01-20 PROCEDURE — 76819 FETAL BIOPHYS PROFIL W/O NST: CPT

## 2021-01-20 PROCEDURE — 76816 OB US FOLLOW-UP PER FETUS: CPT

## 2021-01-27 ENCOUNTER — APPOINTMENT (OUTPATIENT)
Dept: ANTEPARTUM | Facility: CLINIC | Age: 36
End: 2021-01-27
Payer: COMMERCIAL

## 2021-01-27 ENCOUNTER — ASOB RESULT (OUTPATIENT)
Age: 36
End: 2021-01-27

## 2021-01-27 ENCOUNTER — APPOINTMENT (OUTPATIENT)
Dept: ANTEPARTUM | Facility: CLINIC | Age: 36
End: 2021-01-27

## 2021-01-27 PROCEDURE — 76819 FETAL BIOPHYS PROFIL W/O NST: CPT

## 2021-01-27 PROCEDURE — 99072 ADDL SUPL MATRL&STAF TM PHE: CPT

## 2021-02-03 ENCOUNTER — ASOB RESULT (OUTPATIENT)
Age: 36
End: 2021-02-03

## 2021-02-03 ENCOUNTER — APPOINTMENT (OUTPATIENT)
Dept: ANTEPARTUM | Facility: CLINIC | Age: 36
End: 2021-02-03

## 2021-02-03 ENCOUNTER — OUTPATIENT (OUTPATIENT)
Dept: OUTPATIENT SERVICES | Facility: HOSPITAL | Age: 36
LOS: 1 days | End: 2021-02-03

## 2021-02-03 ENCOUNTER — APPOINTMENT (OUTPATIENT)
Dept: ANTEPARTUM | Facility: CLINIC | Age: 36
End: 2021-02-03
Payer: COMMERCIAL

## 2021-02-03 DIAGNOSIS — Z98.890 OTHER SPECIFIED POSTPROCEDURAL STATES: Chronic | ICD-10-CM

## 2021-02-03 PROCEDURE — 76818 FETAL BIOPHYS PROFILE W/NST: CPT | Mod: 26

## 2021-02-10 ENCOUNTER — OUTPATIENT (OUTPATIENT)
Dept: OUTPATIENT SERVICES | Facility: HOSPITAL | Age: 36
LOS: 1 days | End: 2021-02-10

## 2021-02-10 ENCOUNTER — ASOB RESULT (OUTPATIENT)
Age: 36
End: 2021-02-10

## 2021-02-10 ENCOUNTER — APPOINTMENT (OUTPATIENT)
Dept: ANTEPARTUM | Facility: CLINIC | Age: 36
End: 2021-02-10
Payer: COMMERCIAL

## 2021-02-10 DIAGNOSIS — Z98.890 OTHER SPECIFIED POSTPROCEDURAL STATES: Chronic | ICD-10-CM

## 2021-02-10 PROCEDURE — 99072 ADDL SUPL MATRL&STAF TM PHE: CPT

## 2021-02-10 PROCEDURE — 76816 OB US FOLLOW-UP PER FETUS: CPT

## 2021-02-10 PROCEDURE — 76818 FETAL BIOPHYS PROFILE W/NST: CPT | Mod: 26

## 2021-02-17 ENCOUNTER — APPOINTMENT (OUTPATIENT)
Dept: ANTEPARTUM | Facility: CLINIC | Age: 36
End: 2021-02-17
Payer: COMMERCIAL

## 2021-02-17 ENCOUNTER — OUTPATIENT (OUTPATIENT)
Dept: OUTPATIENT SERVICES | Facility: HOSPITAL | Age: 36
LOS: 1 days | End: 2021-02-17

## 2021-02-17 ENCOUNTER — ASOB RESULT (OUTPATIENT)
Age: 36
End: 2021-02-17

## 2021-02-17 DIAGNOSIS — Z98.890 OTHER SPECIFIED POSTPROCEDURAL STATES: Chronic | ICD-10-CM

## 2021-02-17 PROCEDURE — 76818 FETAL BIOPHYS PROFILE W/NST: CPT | Mod: 26

## 2021-02-18 DIAGNOSIS — Z3A.36 36 WEEKS GESTATION OF PREGNANCY: ICD-10-CM

## 2021-02-18 DIAGNOSIS — O34.13 MATERNAL CARE FOR BENIGN TUMOR OF CORPUS UTERI, THIRD TRIMESTER: ICD-10-CM

## 2021-02-18 DIAGNOSIS — O10.013 PRE-EXISTING ESSENTIAL HYPERTENSION COMPLICATING PREGNANCY, THIRD TRIMESTER: ICD-10-CM

## 2021-02-18 DIAGNOSIS — O99.283 ENDOCRINE, NUTRITIONAL AND METABOLIC DISEASES COMPLICATING PREGNANCY, THIRD TRIMESTER: ICD-10-CM

## 2021-02-23 ENCOUNTER — APPOINTMENT (OUTPATIENT)
Dept: DISASTER EMERGENCY | Facility: CLINIC | Age: 36
End: 2021-02-23

## 2021-02-23 ENCOUNTER — LABORATORY RESULT (OUTPATIENT)
Age: 36
End: 2021-02-23

## 2021-02-24 ENCOUNTER — ASOB RESULT (OUTPATIENT)
Age: 36
End: 2021-02-24

## 2021-02-24 ENCOUNTER — OUTPATIENT (OUTPATIENT)
Dept: OUTPATIENT SERVICES | Facility: HOSPITAL | Age: 36
LOS: 1 days | End: 2021-02-24

## 2021-02-24 ENCOUNTER — APPOINTMENT (OUTPATIENT)
Dept: ANTEPARTUM | Facility: CLINIC | Age: 36
End: 2021-02-24
Payer: COMMERCIAL

## 2021-02-24 ENCOUNTER — APPOINTMENT (OUTPATIENT)
Dept: ANTEPARTUM | Facility: HOSPITAL | Age: 36
End: 2021-02-24

## 2021-02-24 ENCOUNTER — APPOINTMENT (OUTPATIENT)
Dept: DISASTER EMERGENCY | Facility: CLINIC | Age: 36
End: 2021-02-24

## 2021-02-24 DIAGNOSIS — Z98.890 OTHER SPECIFIED POSTPROCEDURAL STATES: Chronic | ICD-10-CM

## 2021-02-24 DIAGNOSIS — O34.13 MATERNAL CARE FOR BENIGN TUMOR OF CORPUS UTERI, THIRD TRIMESTER: ICD-10-CM

## 2021-02-24 DIAGNOSIS — O10.013 PRE-EXISTING ESSENTIAL HYPERTENSION COMPLICATING PREGNANCY, THIRD TRIMESTER: ICD-10-CM

## 2021-02-24 DIAGNOSIS — Z3A.37 37 WEEKS GESTATION OF PREGNANCY: ICD-10-CM

## 2021-02-24 DIAGNOSIS — O99.283 ENDOCRINE, NUTRITIONAL AND METABOLIC DISEASES COMPLICATING PREGNANCY, THIRD TRIMESTER: ICD-10-CM

## 2021-02-24 PROCEDURE — 76818 FETAL BIOPHYS PROFILE W/NST: CPT | Mod: 26

## 2021-02-26 ENCOUNTER — INPATIENT (INPATIENT)
Facility: HOSPITAL | Age: 36
LOS: 3 days | Discharge: HOME | End: 2021-03-02
Attending: OBSTETRICS & GYNECOLOGY | Admitting: OBSTETRICS & GYNECOLOGY

## 2021-02-26 VITALS — OXYGEN SATURATION: 98 % | HEART RATE: 81 BPM

## 2021-02-26 DIAGNOSIS — O13.9 GESTATIONAL [PREGNANCY-INDUCED] HYPERTENSION WITHOUT SIGNIFICANT PROTEINURIA, UNSPECIFIED TRIMESTER: ICD-10-CM

## 2021-02-26 DIAGNOSIS — Z98.890 OTHER SPECIFIED POSTPROCEDURAL STATES: Chronic | ICD-10-CM

## 2021-02-26 LAB
ALBUMIN SERPL ELPH-MCNC: 3.7 G/DL — SIGNIFICANT CHANGE UP (ref 3.3–5)
ALP SERPL-CCNC: 181 U/L — HIGH (ref 40–120)
ALT FLD-CCNC: 9 U/L — SIGNIFICANT CHANGE UP (ref 4–33)
ANION GAP SERPL CALC-SCNC: 13 MMOL/L — SIGNIFICANT CHANGE UP (ref 7–14)
APPEARANCE UR: CLEAR — SIGNIFICANT CHANGE UP
APTT BLD: 26.3 SEC — LOW (ref 27–36.3)
AST SERPL-CCNC: 14 U/L — SIGNIFICANT CHANGE UP (ref 4–32)
BASOPHILS # BLD AUTO: 0.03 K/UL — SIGNIFICANT CHANGE UP (ref 0–0.2)
BASOPHILS NFR BLD AUTO: 0.4 % — SIGNIFICANT CHANGE UP (ref 0–2)
BILIRUB SERPL-MCNC: 0.5 MG/DL — SIGNIFICANT CHANGE UP (ref 0.2–1.2)
BILIRUB UR-MCNC: NEGATIVE — SIGNIFICANT CHANGE UP
BLD GP AB SCN SERPL QL: NEGATIVE — SIGNIFICANT CHANGE UP
BUN SERPL-MCNC: 7 MG/DL — SIGNIFICANT CHANGE UP (ref 7–23)
CALCIUM SERPL-MCNC: 9.3 MG/DL — SIGNIFICANT CHANGE UP (ref 8.4–10.5)
CHLORIDE SERPL-SCNC: 102 MMOL/L — SIGNIFICANT CHANGE UP (ref 98–107)
CO2 SERPL-SCNC: 21 MMOL/L — LOW (ref 22–31)
COLOR SPEC: SIGNIFICANT CHANGE UP
CREAT ?TM UR-MCNC: 26 MG/DL — SIGNIFICANT CHANGE UP
CREAT SERPL-MCNC: 0.61 MG/DL — SIGNIFICANT CHANGE UP (ref 0.5–1.3)
DIFF PNL FLD: NEGATIVE — SIGNIFICANT CHANGE UP
EOSINOPHIL # BLD AUTO: 0.08 K/UL — SIGNIFICANT CHANGE UP (ref 0–0.5)
EOSINOPHIL NFR BLD AUTO: 1 % — SIGNIFICANT CHANGE UP (ref 0–6)
FIBRINOGEN PPP-MCNC: 643 MG/DL — HIGH (ref 290–520)
GLUCOSE SERPL-MCNC: 85 MG/DL — SIGNIFICANT CHANGE UP (ref 70–99)
GLUCOSE UR QL: NEGATIVE — SIGNIFICANT CHANGE UP
HCT VFR BLD CALC: 38.7 % — SIGNIFICANT CHANGE UP (ref 34.5–45)
HGB BLD-MCNC: 12.6 G/DL — SIGNIFICANT CHANGE UP (ref 11.5–15.5)
IANC: 6.29 K/UL — SIGNIFICANT CHANGE UP (ref 1.5–8.5)
IMM GRANULOCYTES NFR BLD AUTO: 1.2 % — SIGNIFICANT CHANGE UP (ref 0–1.5)
INR BLD: 0.97 RATIO — SIGNIFICANT CHANGE UP (ref 0.88–1.16)
KETONES UR-MCNC: NEGATIVE — SIGNIFICANT CHANGE UP
LDH SERPL L TO P-CCNC: 159 U/L — SIGNIFICANT CHANGE UP (ref 135–225)
LEUKOCYTE ESTERASE UR-ACNC: NEGATIVE — SIGNIFICANT CHANGE UP
LYMPHOCYTES # BLD AUTO: 1.04 K/UL — SIGNIFICANT CHANGE UP (ref 1–3.3)
LYMPHOCYTES # BLD AUTO: 12.8 % — LOW (ref 13–44)
MCHC RBC-ENTMCNC: 28.6 PG — SIGNIFICANT CHANGE UP (ref 27–34)
MCHC RBC-ENTMCNC: 32.6 GM/DL — SIGNIFICANT CHANGE UP (ref 32–36)
MCV RBC AUTO: 87.8 FL — SIGNIFICANT CHANGE UP (ref 80–100)
MONOCYTES # BLD AUTO: 0.61 K/UL — SIGNIFICANT CHANGE UP (ref 0–0.9)
MONOCYTES NFR BLD AUTO: 7.5 % — SIGNIFICANT CHANGE UP (ref 2–14)
NEUTROPHILS # BLD AUTO: 6.29 K/UL — SIGNIFICANT CHANGE UP (ref 1.8–7.4)
NEUTROPHILS NFR BLD AUTO: 77.1 % — HIGH (ref 43–77)
NITRITE UR-MCNC: NEGATIVE — SIGNIFICANT CHANGE UP
NRBC # BLD: 0 /100 WBCS — SIGNIFICANT CHANGE UP
NRBC # FLD: 0 K/UL — SIGNIFICANT CHANGE UP
PH UR: 7 — SIGNIFICANT CHANGE UP (ref 5–8)
PLATELET # BLD AUTO: 286 K/UL — SIGNIFICANT CHANGE UP (ref 150–400)
POTASSIUM SERPL-MCNC: 3.9 MMOL/L — SIGNIFICANT CHANGE UP (ref 3.5–5.3)
POTASSIUM SERPL-SCNC: 3.9 MMOL/L — SIGNIFICANT CHANGE UP (ref 3.5–5.3)
PROT ?TM UR-MCNC: 6 MG/DL — SIGNIFICANT CHANGE UP
PROT ?TM UR-MCNC: 6 MG/DL — SIGNIFICANT CHANGE UP
PROT SERPL-MCNC: 6.5 G/DL — SIGNIFICANT CHANGE UP (ref 6–8.3)
PROT UR-MCNC: NEGATIVE — SIGNIFICANT CHANGE UP
PROT/CREAT UR-RTO: 0.2 RATIO — SIGNIFICANT CHANGE UP (ref 0–0.2)
PROTHROM AB SERPL-ACNC: 11.2 SEC — SIGNIFICANT CHANGE UP (ref 10.6–13.6)
RBC # BLD: 4.41 M/UL — SIGNIFICANT CHANGE UP (ref 3.8–5.2)
RBC # FLD: 14.2 % — SIGNIFICANT CHANGE UP (ref 10.3–14.5)
RH IG SCN BLD-IMP: POSITIVE — SIGNIFICANT CHANGE UP
SODIUM SERPL-SCNC: 136 MMOL/L — SIGNIFICANT CHANGE UP (ref 135–145)
SP GR SPEC: 1.01 — LOW (ref 1.01–1.02)
URATE SERPL-MCNC: 4.8 MG/DL — SIGNIFICANT CHANGE UP (ref 2.5–7)
UROBILINOGEN FLD QL: SIGNIFICANT CHANGE UP
WBC # BLD: 8.15 K/UL — SIGNIFICANT CHANGE UP (ref 3.8–10.5)
WBC # FLD AUTO: 8.15 K/UL — SIGNIFICANT CHANGE UP (ref 3.8–10.5)

## 2021-02-26 RX ORDER — NIFEDIPINE 30 MG
30 TABLET, EXTENDED RELEASE 24 HR ORAL DAILY
Refills: 0 | Status: DISCONTINUED | OUTPATIENT
Start: 2021-02-26 | End: 2021-03-02

## 2021-02-26 RX ORDER — LEVOTHYROXINE SODIUM 125 MCG
100 TABLET ORAL DAILY
Refills: 0 | Status: DISCONTINUED | OUTPATIENT
Start: 2021-02-26 | End: 2021-03-01

## 2021-02-26 RX ORDER — LABETALOL HCL 100 MG
300 TABLET ORAL EVERY 8 HOURS
Refills: 0 | Status: DISCONTINUED | OUTPATIENT
Start: 2021-02-26 | End: 2021-03-02

## 2021-02-26 RX ORDER — SODIUM CHLORIDE 9 MG/ML
1000 INJECTION, SOLUTION INTRAVENOUS
Refills: 0 | Status: DISCONTINUED | OUTPATIENT
Start: 2021-02-26 | End: 2021-02-28

## 2021-02-26 RX ORDER — CITRIC ACID/SODIUM CITRATE 300-500 MG
15 SOLUTION, ORAL ORAL EVERY 6 HOURS
Refills: 0 | Status: DISCONTINUED | OUTPATIENT
Start: 2021-02-26 | End: 2021-02-28

## 2021-02-26 RX ORDER — OXYTOCIN 10 UNIT/ML
333.33 VIAL (ML) INJECTION
Qty: 20 | Refills: 0 | Status: DISCONTINUED | OUTPATIENT
Start: 2021-02-26 | End: 2021-02-28

## 2021-02-26 RX ADMIN — Medication 30 MILLIGRAM(S): at 21:37

## 2021-02-26 RX ADMIN — Medication 300 MILLIGRAM(S): at 21:38

## 2021-02-26 RX ADMIN — SODIUM CHLORIDE 125 MILLILITER(S): 9 INJECTION, SOLUTION INTRAVENOUS at 21:32

## 2021-02-26 NOTE — OB RN PATIENT PROFILE - THE METHOD OF FEEDING WHEN THE NEWBORN REQUESTS AND CONTINUING EACH FEEDING SESSION UNTIL THE NEWBORN IS SATISFIED
Complex Repair And Split-Thickness Skin Graft Text: The defect edges were debeveled with a #15 scalpel blade.  The primary defect was closed partially with a complex linear closure.  Given the location of the defect, shape of the defect and the proximity to free margins a split thickness skin graft was deemed most appropriate to repair the remaining defect.  The graft was trimmed to fit the size of the remaining defect.  The graft was then placed in the primary defect, oriented appropriately, and sutured into place. Statement Selected

## 2021-02-26 NOTE — OB RN PATIENT PROFILE - PRO BLOOD TYPE INFANT
"Subjective:      Gary Martinez B positive

## 2021-02-26 NOTE — OB PROVIDER H&P - ASSESSMENT
37yo  @39w5d presents for scheduled IOL 2/2 cHTN  - admit to L&D  - routine labs  - HELLP labs  - COVID (-) outpt  - IVF  - EFM, [place]  - IOL w/ po cytotec    D/W Dr. Yasmin Samuels, PGY-1

## 2021-02-26 NOTE — OB PROVIDER H&P - HISTORY OF PRESENT ILLNESS
35yo  @39w5d presents for scheduled IOL 2/2 cHTN  +FM. -VB. -LOF. Occasional Ctx.  EFW: 3100g  GBS: (-)    PNC; c/b cHTN, hypothyroidism    OBHx:   GynHx: reports uterine fibroids, unknown size/location; denies hx of ov. cysts/STDs/abnml pap smears  PMHx: HTN (prev. on procardia 30, labetalol 100 bid)  SHx: L lumpectomy of benign epithelial cyst (2017)  Meds: procardia 30 qd, labetalol 300 TID, ASA, PNVs, Vit D, synthroid 100 mcg  All: NKDA  Psych: denies hx of anxiety/depression

## 2021-02-27 ENCOUNTER — TRANSCRIPTION ENCOUNTER (OUTPATIENT)
Age: 36
End: 2021-02-27

## 2021-02-27 LAB
ALBUMIN SERPL ELPH-MCNC: 3.2 G/DL — LOW (ref 3.3–5)
ALBUMIN SERPL ELPH-MCNC: 3.5 G/DL — SIGNIFICANT CHANGE UP (ref 3.3–5)
ALP SERPL-CCNC: 172 U/L — HIGH (ref 40–120)
ALP SERPL-CCNC: 185 U/L — HIGH (ref 40–120)
ALT FLD-CCNC: 7 U/L — SIGNIFICANT CHANGE UP (ref 4–33)
ALT FLD-CCNC: 9 U/L — SIGNIFICANT CHANGE UP (ref 4–33)
ANION GAP SERPL CALC-SCNC: 12 MMOL/L — SIGNIFICANT CHANGE UP (ref 7–14)
ANION GAP SERPL CALC-SCNC: 9 MMOL/L — SIGNIFICANT CHANGE UP (ref 7–14)
APTT BLD: 26.1 SEC — LOW (ref 27–36.3)
APTT BLD: 26.5 SEC — LOW (ref 27–36.3)
AST SERPL-CCNC: 12 U/L — SIGNIFICANT CHANGE UP (ref 4–32)
AST SERPL-CCNC: 13 U/L — SIGNIFICANT CHANGE UP (ref 4–32)
BASOPHILS # BLD AUTO: 0.03 K/UL — SIGNIFICANT CHANGE UP (ref 0–0.2)
BASOPHILS # BLD AUTO: 0.05 K/UL — SIGNIFICANT CHANGE UP (ref 0–0.2)
BASOPHILS NFR BLD AUTO: 0.3 % — SIGNIFICANT CHANGE UP (ref 0–2)
BASOPHILS NFR BLD AUTO: 0.6 % — SIGNIFICANT CHANGE UP (ref 0–2)
BILIRUB SERPL-MCNC: 0.5 MG/DL — SIGNIFICANT CHANGE UP (ref 0.2–1.2)
BILIRUB SERPL-MCNC: 0.7 MG/DL — SIGNIFICANT CHANGE UP (ref 0.2–1.2)
BUN SERPL-MCNC: 6 MG/DL — LOW (ref 7–23)
BUN SERPL-MCNC: 6 MG/DL — LOW (ref 7–23)
CALCIUM SERPL-MCNC: 9 MG/DL — SIGNIFICANT CHANGE UP (ref 8.4–10.5)
CALCIUM SERPL-MCNC: 9.7 MG/DL — SIGNIFICANT CHANGE UP (ref 8.4–10.5)
CHLORIDE SERPL-SCNC: 104 MMOL/L — SIGNIFICANT CHANGE UP (ref 98–107)
CHLORIDE SERPL-SCNC: 104 MMOL/L — SIGNIFICANT CHANGE UP (ref 98–107)
CO2 SERPL-SCNC: 21 MMOL/L — LOW (ref 22–31)
CO2 SERPL-SCNC: 21 MMOL/L — LOW (ref 22–31)
CREAT SERPL-MCNC: 0.61 MG/DL — SIGNIFICANT CHANGE UP (ref 0.5–1.3)
CREAT SERPL-MCNC: 0.63 MG/DL — SIGNIFICANT CHANGE UP (ref 0.5–1.3)
EOSINOPHIL # BLD AUTO: 0.09 K/UL — SIGNIFICANT CHANGE UP (ref 0–0.5)
EOSINOPHIL # BLD AUTO: 0.1 K/UL — SIGNIFICANT CHANGE UP (ref 0–0.5)
EOSINOPHIL NFR BLD AUTO: 0.9 % — SIGNIFICANT CHANGE UP (ref 0–6)
EOSINOPHIL NFR BLD AUTO: 1 % — SIGNIFICANT CHANGE UP (ref 0–6)
FIBRINOGEN PPP-MCNC: 633 MG/DL — HIGH (ref 290–520)
FIBRINOGEN PPP-MCNC: 684 MG/DL — HIGH (ref 290–520)
GLUCOSE SERPL-MCNC: 83 MG/DL — SIGNIFICANT CHANGE UP (ref 70–99)
GLUCOSE SERPL-MCNC: 86 MG/DL — SIGNIFICANT CHANGE UP (ref 70–99)
HCT VFR BLD CALC: 37.4 % — SIGNIFICANT CHANGE UP (ref 34.5–45)
HCT VFR BLD CALC: 38 % — SIGNIFICANT CHANGE UP (ref 34.5–45)
HGB BLD-MCNC: 11.9 G/DL — SIGNIFICANT CHANGE UP (ref 11.5–15.5)
HGB BLD-MCNC: 12.7 G/DL — SIGNIFICANT CHANGE UP (ref 11.5–15.5)
IANC: 7.09 K/UL — SIGNIFICANT CHANGE UP (ref 1.5–8.5)
IANC: 8.67 K/UL — HIGH (ref 1.5–8.5)
IMM GRANULOCYTES NFR BLD AUTO: 0.6 % — SIGNIFICANT CHANGE UP (ref 0–1.5)
IMM GRANULOCYTES NFR BLD AUTO: 1.1 % — SIGNIFICANT CHANGE UP (ref 0–1.5)
INR BLD: 1 RATIO — SIGNIFICANT CHANGE UP (ref 0.88–1.16)
INR BLD: 1.01 RATIO — SIGNIFICANT CHANGE UP (ref 0.88–1.16)
LDH SERPL L TO P-CCNC: 137 U/L — SIGNIFICANT CHANGE UP (ref 135–225)
LDH SERPL L TO P-CCNC: 156 U/L — SIGNIFICANT CHANGE UP (ref 135–225)
LYMPHOCYTES # BLD AUTO: 0.9 K/UL — LOW (ref 1–3.3)
LYMPHOCYTES # BLD AUTO: 0.99 K/UL — LOW (ref 1–3.3)
LYMPHOCYTES # BLD AUTO: 11.1 % — LOW (ref 13–44)
LYMPHOCYTES # BLD AUTO: 8.5 % — LOW (ref 13–44)
MCHC RBC-ENTMCNC: 28.1 PG — SIGNIFICANT CHANGE UP (ref 27–34)
MCHC RBC-ENTMCNC: 28.7 PG — SIGNIFICANT CHANGE UP (ref 27–34)
MCHC RBC-ENTMCNC: 31.8 GM/DL — LOW (ref 32–36)
MCHC RBC-ENTMCNC: 33.4 GM/DL — SIGNIFICANT CHANGE UP (ref 32–36)
MCV RBC AUTO: 86 FL — SIGNIFICANT CHANGE UP (ref 80–100)
MCV RBC AUTO: 88.4 FL — SIGNIFICANT CHANGE UP (ref 80–100)
MONOCYTES # BLD AUTO: 0.61 K/UL — SIGNIFICANT CHANGE UP (ref 0–0.9)
MONOCYTES # BLD AUTO: 0.81 K/UL — SIGNIFICANT CHANGE UP (ref 0–0.9)
MONOCYTES NFR BLD AUTO: 6.8 % — SIGNIFICANT CHANGE UP (ref 2–14)
MONOCYTES NFR BLD AUTO: 7.7 % — SIGNIFICANT CHANGE UP (ref 2–14)
NEUTROPHILS # BLD AUTO: 7.09 K/UL — SIGNIFICANT CHANGE UP (ref 1.8–7.4)
NEUTROPHILS # BLD AUTO: 8.67 K/UL — HIGH (ref 1.8–7.4)
NEUTROPHILS NFR BLD AUTO: 79.4 % — HIGH (ref 43–77)
NEUTROPHILS NFR BLD AUTO: 82 % — HIGH (ref 43–77)
NRBC # BLD: 0 /100 WBCS — SIGNIFICANT CHANGE UP
NRBC # BLD: 0 /100 WBCS — SIGNIFICANT CHANGE UP
NRBC # FLD: 0 K/UL — SIGNIFICANT CHANGE UP
NRBC # FLD: 0 K/UL — SIGNIFICANT CHANGE UP
PLATELET # BLD AUTO: 269 K/UL — SIGNIFICANT CHANGE UP (ref 150–400)
PLATELET # BLD AUTO: 276 K/UL — SIGNIFICANT CHANGE UP (ref 150–400)
POTASSIUM SERPL-MCNC: 3.7 MMOL/L — SIGNIFICANT CHANGE UP (ref 3.5–5.3)
POTASSIUM SERPL-MCNC: 3.9 MMOL/L — SIGNIFICANT CHANGE UP (ref 3.5–5.3)
POTASSIUM SERPL-SCNC: 3.7 MMOL/L — SIGNIFICANT CHANGE UP (ref 3.5–5.3)
POTASSIUM SERPL-SCNC: 3.9 MMOL/L — SIGNIFICANT CHANGE UP (ref 3.5–5.3)
PROT SERPL-MCNC: 6.3 G/DL — SIGNIFICANT CHANGE UP (ref 6–8.3)
PROT SERPL-MCNC: 6.6 G/DL — SIGNIFICANT CHANGE UP (ref 6–8.3)
PROTHROM AB SERPL-ACNC: 11.4 SEC — SIGNIFICANT CHANGE UP (ref 10.6–13.6)
PROTHROM AB SERPL-ACNC: 11.5 SEC — SIGNIFICANT CHANGE UP (ref 10.6–13.6)
RBC # BLD: 4.23 M/UL — SIGNIFICANT CHANGE UP (ref 3.8–5.2)
RBC # BLD: 4.42 M/UL — SIGNIFICANT CHANGE UP (ref 3.8–5.2)
RBC # FLD: 14.4 % — SIGNIFICANT CHANGE UP (ref 10.3–14.5)
RBC # FLD: 14.4 % — SIGNIFICANT CHANGE UP (ref 10.3–14.5)
SODIUM SERPL-SCNC: 134 MMOL/L — LOW (ref 135–145)
SODIUM SERPL-SCNC: 137 MMOL/L — SIGNIFICANT CHANGE UP (ref 135–145)
T PALLIDUM AB TITR SER: NEGATIVE — SIGNIFICANT CHANGE UP
URATE SERPL-MCNC: 4.7 MG/DL — SIGNIFICANT CHANGE UP (ref 2.5–7)
URATE SERPL-MCNC: 4.8 MG/DL — SIGNIFICANT CHANGE UP (ref 2.5–7)
WBC # BLD: 10.57 K/UL — HIGH (ref 3.8–10.5)
WBC # BLD: 8.93 K/UL — SIGNIFICANT CHANGE UP (ref 3.8–10.5)
WBC # FLD AUTO: 10.57 K/UL — HIGH (ref 3.8–10.5)
WBC # FLD AUTO: 8.93 K/UL — SIGNIFICANT CHANGE UP (ref 3.8–10.5)

## 2021-02-27 RX ADMIN — Medication 300 MILLIGRAM(S): at 13:32

## 2021-02-27 RX ADMIN — Medication 100 MICROGRAM(S): at 05:44

## 2021-02-27 RX ADMIN — Medication 30 MILLIGRAM(S): at 21:28

## 2021-02-27 RX ADMIN — Medication 300 MILLIGRAM(S): at 05:44

## 2021-02-27 RX ADMIN — Medication 300 MILLIGRAM(S): at 21:28

## 2021-02-27 NOTE — OB PROVIDER IHI INDUCTION/AUGMENTATION NOTE - NS_EFFACEMANT_OBGYN_ALL_OB_NU
Patient to be called about lipid panel and blood glucose. Lipid panel shows slightly low edge feel cholesterol and slightly elevated LDL cholesterol. Blood glucose is slightly elevated. Continue to watch diet and exercise. Weight management ordered.  
0

## 2021-02-27 NOTE — CHART NOTE - NSCHARTNOTEFT_GEN_A_CORE
PO cytotec IOL  patient reports increased pressure  VE: 1-2/30/-3, anterior cervix  CB placed 60U/60V  patient tolerated placement well  FHT Cat I overall, intermittent minimal variability  Clear liquid diet for now  Patient declined pain management at this time, will be for epidural if desires, patient aware  Continue cytotec induction

## 2021-02-28 ENCOUNTER — TRANSCRIPTION ENCOUNTER (OUTPATIENT)
Age: 36
End: 2021-02-28

## 2021-02-28 LAB
SARS-COV-2 IGG SERPL QL IA: NEGATIVE — SIGNIFICANT CHANGE UP
SARS-COV-2 IGM SERPL IA-ACNC: 0.07 INDEX — SIGNIFICANT CHANGE UP

## 2021-02-28 RX ORDER — CITRIC ACID/SODIUM CITRATE 300-500 MG
30 SOLUTION, ORAL ORAL ONCE
Refills: 0 | Status: COMPLETED | OUTPATIENT
Start: 2021-02-28 | End: 2021-02-28

## 2021-02-28 RX ORDER — KETOROLAC TROMETHAMINE 30 MG/ML
30 SYRINGE (ML) INJECTION EVERY 6 HOURS
Refills: 0 | Status: DISCONTINUED | OUTPATIENT
Start: 2021-02-28 | End: 2021-03-01

## 2021-02-28 RX ORDER — OXYTOCIN 10 UNIT/ML
2 VIAL (ML) INJECTION
Qty: 30 | Refills: 0 | Status: DISCONTINUED | OUTPATIENT
Start: 2021-02-28 | End: 2021-03-01

## 2021-02-28 RX ORDER — OXYCODONE HYDROCHLORIDE 5 MG/1
5 TABLET ORAL
Refills: 0 | Status: COMPLETED | OUTPATIENT
Start: 2021-02-28 | End: 2021-03-07

## 2021-02-28 RX ORDER — SODIUM CHLORIDE 9 MG/ML
1000 INJECTION, SOLUTION INTRAVENOUS
Refills: 0 | Status: DISCONTINUED | OUTPATIENT
Start: 2021-02-28 | End: 2021-03-01

## 2021-02-28 RX ORDER — DEXAMETHASONE 0.5 MG/5ML
4 ELIXIR ORAL EVERY 6 HOURS
Refills: 0 | Status: DISCONTINUED | OUTPATIENT
Start: 2021-02-28 | End: 2021-03-01

## 2021-02-28 RX ORDER — IBUPROFEN 200 MG
600 TABLET ORAL EVERY 6 HOURS
Refills: 0 | Status: COMPLETED | OUTPATIENT
Start: 2021-02-28 | End: 2022-01-27

## 2021-02-28 RX ORDER — TETANUS TOXOID, REDUCED DIPHTHERIA TOXOID AND ACELLULAR PERTUSSIS VACCINE, ADSORBED 5; 2.5; 8; 8; 2.5 [IU]/.5ML; [IU]/.5ML; UG/.5ML; UG/.5ML; UG/.5ML
0.5 SUSPENSION INTRAMUSCULAR ONCE
Refills: 0 | Status: DISCONTINUED | OUTPATIENT
Start: 2021-02-28 | End: 2021-03-02

## 2021-02-28 RX ORDER — ONDANSETRON 8 MG/1
4 TABLET, FILM COATED ORAL EVERY 6 HOURS
Refills: 0 | Status: DISCONTINUED | OUTPATIENT
Start: 2021-02-28 | End: 2021-03-02

## 2021-02-28 RX ORDER — LANOLIN
1 OINTMENT (GRAM) TOPICAL EVERY 6 HOURS
Refills: 0 | Status: DISCONTINUED | OUTPATIENT
Start: 2021-02-28 | End: 2021-03-02

## 2021-02-28 RX ORDER — ACETAMINOPHEN 500 MG
975 TABLET ORAL
Refills: 0 | Status: DISCONTINUED | OUTPATIENT
Start: 2021-02-28 | End: 2021-03-02

## 2021-02-28 RX ORDER — ERTAPENEM SODIUM 1 G/1
1000 INJECTION, POWDER, LYOPHILIZED, FOR SOLUTION INTRAMUSCULAR; INTRAVENOUS ONCE
Refills: 0 | Status: COMPLETED | OUTPATIENT
Start: 2021-02-28 | End: 2021-02-28

## 2021-02-28 RX ORDER — NALOXONE HYDROCHLORIDE 4 MG/.1ML
0.1 SPRAY NASAL
Refills: 0 | Status: DISCONTINUED | OUTPATIENT
Start: 2021-02-28 | End: 2021-03-01

## 2021-02-28 RX ORDER — DIPHENHYDRAMINE HCL 50 MG
25 CAPSULE ORAL EVERY 6 HOURS
Refills: 0 | Status: DISCONTINUED | OUTPATIENT
Start: 2021-02-28 | End: 2021-03-02

## 2021-02-28 RX ORDER — MAGNESIUM HYDROXIDE 400 MG/1
30 TABLET, CHEWABLE ORAL
Refills: 0 | Status: DISCONTINUED | OUTPATIENT
Start: 2021-02-28 | End: 2021-03-02

## 2021-02-28 RX ORDER — BUTORPHANOL TARTRATE 2 MG/ML
0.12 INJECTION, SOLUTION INTRAMUSCULAR; INTRAVENOUS EVERY 6 HOURS
Refills: 0 | Status: DISCONTINUED | OUTPATIENT
Start: 2021-02-28 | End: 2021-03-01

## 2021-02-28 RX ORDER — OXYCODONE HYDROCHLORIDE 5 MG/1
10 TABLET ORAL
Refills: 0 | Status: DISCONTINUED | OUTPATIENT
Start: 2021-02-28 | End: 2021-03-01

## 2021-02-28 RX ORDER — SIMETHICONE 80 MG/1
80 TABLET, CHEWABLE ORAL EVERY 4 HOURS
Refills: 0 | Status: DISCONTINUED | OUTPATIENT
Start: 2021-02-28 | End: 2021-03-02

## 2021-02-28 RX ORDER — FAMOTIDINE 10 MG/ML
20 INJECTION INTRAVENOUS ONCE
Refills: 0 | Status: COMPLETED | OUTPATIENT
Start: 2021-02-28 | End: 2021-02-28

## 2021-02-28 RX ORDER — HEPARIN SODIUM 5000 [USP'U]/ML
5000 INJECTION INTRAVENOUS; SUBCUTANEOUS EVERY 12 HOURS
Refills: 0 | Status: DISCONTINUED | OUTPATIENT
Start: 2021-02-28 | End: 2021-03-02

## 2021-02-28 RX ORDER — METOCLOPRAMIDE HCL 10 MG
10 TABLET ORAL ONCE
Refills: 0 | Status: COMPLETED | OUTPATIENT
Start: 2021-02-28 | End: 2021-02-28

## 2021-02-28 RX ORDER — OXYTOCIN 10 UNIT/ML
333.33 VIAL (ML) INJECTION
Qty: 20 | Refills: 0 | Status: DISCONTINUED | OUTPATIENT
Start: 2021-02-28 | End: 2021-03-01

## 2021-02-28 RX ORDER — OXYCODONE HYDROCHLORIDE 5 MG/1
5 TABLET ORAL ONCE
Refills: 0 | Status: DISCONTINUED | OUTPATIENT
Start: 2021-02-28 | End: 2021-03-02

## 2021-02-28 RX ORDER — OXYCODONE HYDROCHLORIDE 5 MG/1
5 TABLET ORAL
Refills: 0 | Status: DISCONTINUED | OUTPATIENT
Start: 2021-02-28 | End: 2021-03-01

## 2021-02-28 RX ADMIN — Medication 0.25 MILLIGRAM(S): at 07:40

## 2021-02-28 RX ADMIN — FAMOTIDINE 20 MILLIGRAM(S): 10 INJECTION INTRAVENOUS at 07:50

## 2021-02-28 RX ADMIN — Medication 300 MILLIGRAM(S): at 06:01

## 2021-02-28 RX ADMIN — Medication 30 MILLILITER(S): at 08:06

## 2021-02-28 RX ADMIN — Medication 1000 MILLIUNIT(S)/MIN: at 10:04

## 2021-02-28 RX ADMIN — HEPARIN SODIUM 5000 UNIT(S): 5000 INJECTION INTRAVENOUS; SUBCUTANEOUS at 17:51

## 2021-02-28 RX ADMIN — Medication 300 MILLIGRAM(S): at 14:16

## 2021-02-28 RX ADMIN — ERTAPENEM SODIUM 120 MILLIGRAM(S): 1 INJECTION, POWDER, LYOPHILIZED, FOR SOLUTION INTRAMUSCULAR; INTRAVENOUS at 12:36

## 2021-02-28 RX ADMIN — Medication 30 MILLIGRAM(S): at 17:52

## 2021-02-28 RX ADMIN — Medication 2 MILLIUNIT(S)/MIN: at 05:27

## 2021-02-28 RX ADMIN — Medication 10 MILLIGRAM(S): at 07:50

## 2021-02-28 RX ADMIN — Medication 100 MICROGRAM(S): at 06:14

## 2021-02-28 NOTE — DISCHARGE NOTE OB - BREASTFEEDING PROVIDES STABLE TEMPERATURE THROUGH SKIN TO SKIN CONTACT
H&P reviewed. The patient was examined and there are no changes to the H&P.        
Statement Selected

## 2021-02-28 NOTE — OB PROVIDER LABOR PROGRESS NOTE - NS_SUBJECTIVE/OBJECTIVE_OBGYN_ALL_OB_FT
Pt evaluated for cervical change
Evaluated for cervical change. Considering analgesia.
Patient evaluated bedside for labor progression. Face presentation on exam.
R1 Labor Note    S: Patient evaluated at bedside for cervical change.     O:  T(C): 36.7 (02-28-21 @ 01:46), Max: 37.0 (02-27-21 @ 13:29)  HR: 76 (02-28-21 @ 02:50) (60 - 78)  BP: 138/70 (02-28-21 @ 02:37) (123/71 - 172/95)  RR: 17 (02-27-21 @ 05:40) (17 - 17)  SpO2: 100% (02-28-21 @ 02:50) (94% - 100%)

## 2021-02-28 NOTE — DISCHARGE NOTE OB - AVOID TUB BATHING UNTIL YOUR POSTPARTUM VISIT
[Hypertension] : hypertension [Improving] : improving [None] : none [Exercise Regimen] : an exercise regimen [Weight Loss] : weight loss [Sodium Restriction] : sodium restriction [Patient] : the patient Statement Selected

## 2021-02-28 NOTE — DISCHARGE NOTE OB - PATIENT PORTAL LINK FT
You can access the FollowMyHealth Patient Portal offered by [facility] by registering at the following website: [email or web address]. By joining [employer]’s FollowMyHealth portal, you will also be able to view your health information using other applications (apps) compatible with our system.

## 2021-02-28 NOTE — OB PROVIDER LABOR PROGRESS NOTE - NS_OBIHIFHRDETAILS_OBGYN_ALL_OB_FT
140s, mod jacqui, +accels, -decels
CAT II, FHT, 140BPM, moderate variability, +accels, intermittent variable decelerations
Baseline 120, Moderate Variability, + Accels, + Spontaneous Late Decels
145, mod jacqui, - accels, - decels MARIA ANTONIA

## 2021-02-28 NOTE — OB RN DELIVERY SUMMARY - NS_SEPSISRSKCALC_OBGYN_ALL_OB_FT
EOS calculated successfully. EOS Risk Factor: 0.5/1000 live births ([facility] national incidence); GA=40w;Temp=98.6; ROM=3.017; GBS='Negative'; Antibiotics='No antibiotics or any antibiotics < 2 hrs prior to birth'

## 2021-02-28 NOTE — OB PROVIDER LABOR PROGRESS NOTE - ASSESSMENT
A/P 36y P0 @ 40 wks IOL  -IOL: 4 Pit   -Recover tracing  -Anticipate     d/w Dr. Jorge A Roberto PGY-1  
37 y/o  39w6d IOL for cHTN.   - would like to hold off on analgesia for now    Yamilet Brady PGY1
Patient is a 37yo  at 40w, cHTN IOL. Course now complicated by face presentation and CATII FHT. Continue resuscitative measures. Dr. Perales to examine.    Joyce Mike, PGY3  D/W Dr. Perales
36yoF a/f IOL for cHTN  - VE: face presentation, difficult to discern mentum anterior vs posterior as too high of station  -cw jeronimo Delgado PGY2

## 2021-02-28 NOTE — DISCHARGE NOTE OB - PLAN OF CARE
wound care Continue Blood pressure medication  Continue to monitor Blood pressures at home 3 times a day  Call the office if BPs >150/ >100  Follow up in the office within a week for BP check Blood pressure Control Follow up in the office in 2 weeks for wound care Follow up in the office 3 days for BP check  PEC precautions reviewed

## 2021-02-28 NOTE — OB RN DELIVERY SUMMARY - NSABNHEARTRATE_OBGYN_ALL_OB
Labor Progress Note        SUBJECTIVE      Reports no complaints.  Resting comfortably with epidural in place.      OBJECTIVE      Vitals:    08/27/19 0908   BP: 122/71   Pulse: 71   Resp:    Temp:         SVE:    Dilation:  4 (08/27/19 0835)  Effacement:  80 (08/27/19 0835)  Station:  -1 (08/27/19 0835)   Consistency:  Soft (08/27/19 0835)  Presentation:  Vertex (08/27/19 0835)     Fetal Surveillance:  Fetal Heart Rate  Mode: External US (08/27/19 0900)  Baseline: 130 bpm (08/27/19 0900)  Classification: Normal (08/27/19 0900)  Variability: Moderate (08/27/19 0900)  Pattern: Accelerations (08/27/19 0900)     Uterine Activity  Mode: [place] (08/27/19 0900)  Frequency: 3-4 (08/27/19 0900)  Duration:  (08/27/19 0900)  Quality: Mild;Moderate (08/27/19 0800)  Resting Tone: Soft (08/27/19 0100)     Membrane Status: Ruptured (08/27/19 0250)  Sac Identifier: Sac 1 (08/27/19 0250)  Rupture Type: Spontaneous (08/27/19 0250)  Rupture Date: 08/27/19 (08/27/19 0250)  Rupture Time: 0250 (08/27/19 0250)  Fluid Color: Clear (08/27/19 0250)  Fluid Odor: Normal (08/27/19 0250)  Fluid Amount: Moderate (08/27/19 0250)     ASSESSMENT      39w1d IUP  IOL for DFM  Inadequate uterine activity   FHR Interpretation: Category I (08/27/19 0900)     PLAN      DIVP augmentation per protocol  CEFM  Anticipate vaginal delivery      Melly Harris CNM   Abnormal Fetal Heart Rate Category II

## 2021-02-28 NOTE — DISCHARGE NOTE OB - MATERIALS PROVIDED
Vaccinations/[facility]  Screening Program/  Immunization Record/Breastfeeding Log/Breastfeeding Mother’s Support Group Information/Guide to Postpartum Care/[facility] Hearing Screen Program/Shaken Baby Prevention Handout/Breastfeeding Guide and Packet/Birth Certificate Instructions/Discharge Medication Information for Patients and Families Pocket Guide/Tdap Vaccination (VIS Pub Date: 2012)

## 2021-02-28 NOTE — DISCHARGE NOTE OB - MEDICATION SUMMARY - MEDICATIONS TO TAKE
I will START or STAY ON the medications listed below when I get home from the hospital:    labetalol 300 mg oral tablet  -- 1 tab(s) by mouth 2 times a day  -- Indication: For CHTN    NIFEdipine 30 mg oral tablet, extended release  -- 1 tab(s) by mouth once a day  -- Indication: For CHTN    Prenatal Multivitamins with Folic Acid 1 mg oral tablet  -- 1 tab(s) by mouth once a day  -- Indication: For Postpartum    levothyroxine 100 mcg (0.1 mg) oral tablet  -- 1 tab(s) by mouth once a day  -- Indication: For Hypothyroidism   I will START or STAY ON the medications listed below when I get home from the hospital:    acetaminophen 325 mg oral tablet  -- 3 tab(s) by mouth every 6 hours   -- Indication: For pain    ibuprofen 600 mg oral tablet  -- 1 tab(s) by mouth every 6 hours, As Needed  -- Indication: For pain    labetalol 300 mg oral tablet  -- 1 tab(s) by mouth 3 times a day  -- Indication: For ChTN    NIFEdipine 30 mg oral tablet, extended release  -- 1 tab(s) by mouth once a day  -- Indication: For ChTN    Prenatal Multivitamins with Folic Acid 1 mg oral tablet  -- 1 tab(s) by mouth once a day  -- Indication: For Postpartum    levothyroxine 100 mcg (0.1 mg) oral tablet  -- 1 tab(s) by mouth once a day  -- Indication: For Hypothyroidism

## 2021-02-28 NOTE — DISCHARGE NOTE OB - CARE PLAN
Principal Discharge DX:	Chronic hypertension  Goal:	Blood pressure Control  Assessment and plan of treatment:	Continue Blood pressure medication  Continue to monitor Blood pressures at home 3 times a day  Call the office if BPs >150/ >100  Follow up in the office within a week for BP check  Secondary Diagnosis:	 delivery delivered  Goal:	wound care  Assessment and plan of treatment:	Follow up in the office in 2 weeks for wound care   Principal Discharge DX:	Chronic hypertension  Goal:	Blood pressure Control  Assessment and plan of treatment:	Continue Blood pressure medication  Continue to monitor Blood pressures at home 3 times a day  Call the office if BPs >150/ >100  Follow up in the office within a week for BP check  Secondary Diagnosis:	 delivery delivered  Goal:	wound care  Assessment and plan of treatment:	Follow up in the office 3 days for BP check  PEC precautions reviewed

## 2021-02-28 NOTE — OB NEONATOLOGY/PEDIATRICIAN DELIVERY SUMMARY - NSPEDSNEONOTESA_OBGYN_ALL_OB_FT
Baby is a 40 wk GA male born to a 37 y/o  mother via C/S. Maternal history notable for hypothyroidism on synthroid and chronic hypertension. Prenatal history uncomplicated. Baby was face down presentation. Maternal BT B+. PNL neg, NR, and immune. GBS neg on 2/15. SROM at 0536 on , mec-stained fluids. Baby born vigorous and crying spontaneously. WDSS. Baby noted to have significant facial swelling likely secondary to face down presentation during labor. Baby has facial abrasion measuring 1 x 1 cm on right cheek. APGARs 9/9. EOS 0.09. Mom plans to breastfeed. Yes to hepB. Yes to circ. Mother COVID status negative.

## 2021-02-28 NOTE — OB PROVIDER DELIVERY SUMMARY - NSPROVIDERDELIVERYNOTE_OBGYN_ALL_OB_FT
Liveborn infant delivered from direct mentum posterior position. Apgars 9/9. Large fibroid uterus. inferior midline extension noted, repaired and incorporated into hysterotomy closure. Good hemostasis noted. EBL 1000cc. QBL 828cc. IVF 1500cc. UOP 175cc. Male  delivered in direct mentum posterior position. APGARS 9/9.  face noted to be swollen. Delayed cord clamping performed. Uterus was not exteriorized secondary to large fibroid uterus. 4cm midline extension noted and repaired. Hysterotomy closed in 1 layer. Good hemostasis noted. EBL 1000cc. QBL 828cc. IVF 1500cc. UOP 175cc.

## 2021-02-28 NOTE — CHART NOTE - NSCHARTNOTEFT_GEN_A_CORE
Patient seen and examined at bedside. Notified by resident that patient progressed to 680/-1 with face presentation. Unsure of the position.,  Patient noted to have a category II tracing with late decelerations.     SVE: 100/-1  Face Presentation Mentum posterior    Discussed findings with patient and  at bedside. Recommend primary  section due to Face presentation (mentum posterior) and Category II tracing.  Discussed risks, benefits, and alternatives. Risks include but not limited to bleeding, infection, and damage to nearby organs.  All questions and concerns were addressed to there satisfaction.     Pre-Op Primary  section                          12.7   10.57 )-----------( 269      ( 2021 19:56 )             38.0

## 2021-02-28 NOTE — DISCHARGE NOTE OB - MEDICATION SUMMARY - MEDICATIONS TO STOP TAKING
I will STOP taking the medications listed below when I get home from the hospital:    aspirin 81 mg oral tablet, chewable  -- 1 tab(s) by mouth once a day    folic acid 1 mg oral tablet  -- 1 tab(s) by mouth once a day   I will STOP taking the medications listed below when I get home from the hospital:    aspirin 81 mg oral tablet, chewable  -- 1 tab(s) by mouth once a day    folic acid 1 mg oral tablet  -- 1 tab(s) by mouth once a day    labetalol 300 mg oral tablet  -- 1 tab(s) by mouth 2 times a day

## 2021-02-28 NOTE — DISCHARGE NOTE OB - CARE PROVIDER_API CALL
Tena Peters)  Obstetrics and Gynecology Obstetrics and Gynocology  [address] [place] [address]  Phone: [phone]  Fax: [phone]  Established Patient  Follow Up Time: 2 weeks

## 2021-03-01 DIAGNOSIS — O10.013 PRE-EXISTING ESSENTIAL HYPERTENSION COMPLICATING PREGNANCY, THIRD TRIMESTER: ICD-10-CM

## 2021-03-01 DIAGNOSIS — O10.012 PRE-EXISTING ESSENTIAL HYPERTENSION COMPLICATING PREGNANCY, SECOND TRIMESTER: ICD-10-CM

## 2021-03-01 DIAGNOSIS — O34.13 MATERNAL CARE FOR BENIGN TUMOR OF CORPUS UTERI, THIRD TRIMESTER: ICD-10-CM

## 2021-03-01 DIAGNOSIS — I10 ESSENTIAL (PRIMARY) HYPERTENSION: ICD-10-CM

## 2021-03-01 DIAGNOSIS — O99.283 ENDOCRINE, NUTRITIONAL AND METABOLIC DISEASES COMPLICATING PREGNANCY, THIRD TRIMESTER: ICD-10-CM

## 2021-03-01 DIAGNOSIS — Z3A.38 38 WEEKS GESTATION OF PREGNANCY: ICD-10-CM

## 2021-03-01 DIAGNOSIS — O34.12 MATERNAL CARE FOR BENIGN TUMOR OF CORPUS UTERI, SECOND TRIMESTER: ICD-10-CM

## 2021-03-01 DIAGNOSIS — Z3A.28 28 WEEKS GESTATION OF PREGNANCY: ICD-10-CM

## 2021-03-01 LAB
HCT VFR BLD CALC: 30.6 % — LOW (ref 34.5–45)
HGB BLD-MCNC: 10.2 G/DL — LOW (ref 11.5–15.5)
MCHC RBC-ENTMCNC: 28.8 PG — SIGNIFICANT CHANGE UP (ref 27–34)
MCHC RBC-ENTMCNC: 33.3 GM/DL — SIGNIFICANT CHANGE UP (ref 32–36)
MCV RBC AUTO: 86.4 FL — SIGNIFICANT CHANGE UP (ref 80–100)
NRBC # BLD: 0 /100 WBCS — SIGNIFICANT CHANGE UP
NRBC # FLD: 0 K/UL — SIGNIFICANT CHANGE UP
PLATELET # BLD AUTO: 215 K/UL — SIGNIFICANT CHANGE UP (ref 150–400)
RBC # BLD: 3.54 M/UL — LOW (ref 3.8–5.2)
RBC # FLD: 14.1 % — SIGNIFICANT CHANGE UP (ref 10.3–14.5)
WBC # BLD: 14.06 K/UL — HIGH (ref 3.8–10.5)
WBC # FLD AUTO: 14.06 K/UL — HIGH (ref 3.8–10.5)

## 2021-03-01 RX ORDER — LABETALOL HCL 100 MG
1 TABLET ORAL
Qty: 0 | Refills: 0 | DISCHARGE

## 2021-03-01 RX ORDER — IBUPROFEN 200 MG
600 TABLET ORAL EVERY 6 HOURS
Refills: 0 | Status: DISCONTINUED | OUTPATIENT
Start: 2021-03-01 | End: 2021-03-02

## 2021-03-01 RX ORDER — OXYCODONE HYDROCHLORIDE 5 MG/1
5 TABLET ORAL
Refills: 0 | Status: DISCONTINUED | OUTPATIENT
Start: 2021-03-01 | End: 2021-03-02

## 2021-03-01 RX ORDER — IBUPROFEN 200 MG
1 TABLET ORAL
Qty: 0 | Refills: 0 | DISCHARGE
Start: 2021-03-01

## 2021-03-01 RX ORDER — ACETAMINOPHEN 500 MG
3 TABLET ORAL
Qty: 60 | Refills: 0
Start: 2021-03-01 | End: 2021-03-05

## 2021-03-01 RX ORDER — FERROUS SULFATE 325(65) MG
325 TABLET ORAL DAILY
Refills: 0 | Status: DISCONTINUED | OUTPATIENT
Start: 2021-03-01 | End: 2021-03-02

## 2021-03-01 RX ORDER — LEVOTHYROXINE SODIUM 125 MCG
50 TABLET ORAL DAILY
Refills: 0 | Status: DISCONTINUED | OUTPATIENT
Start: 2021-03-01 | End: 2021-03-02

## 2021-03-01 RX ADMIN — Medication 975 MILLIGRAM(S): at 06:56

## 2021-03-01 RX ADMIN — Medication 50 MICROGRAM(S): at 06:57

## 2021-03-01 RX ADMIN — SIMETHICONE 80 MILLIGRAM(S): 80 TABLET, CHEWABLE ORAL at 11:44

## 2021-03-01 RX ADMIN — Medication 30 MILLIGRAM(S): at 06:56

## 2021-03-01 RX ADMIN — Medication 300 MILLIGRAM(S): at 14:26

## 2021-03-01 RX ADMIN — Medication 600 MILLIGRAM(S): at 11:44

## 2021-03-01 RX ADMIN — HEPARIN SODIUM 5000 UNIT(S): 5000 INJECTION INTRAVENOUS; SUBCUTANEOUS at 17:19

## 2021-03-01 RX ADMIN — Medication 300 MILLIGRAM(S): at 22:09

## 2021-03-01 RX ADMIN — Medication 975 MILLIGRAM(S): at 00:50

## 2021-03-01 RX ADMIN — Medication 30 MILLIGRAM(S): at 00:45

## 2021-03-01 RX ADMIN — Medication 300 MILLIGRAM(S): at 06:57

## 2021-03-01 RX ADMIN — Medication 1 TABLET(S): at 11:44

## 2021-03-01 RX ADMIN — Medication 30 MILLIGRAM(S): at 22:09

## 2021-03-01 RX ADMIN — Medication 325 MILLIGRAM(S): at 11:44

## 2021-03-01 RX ADMIN — Medication 600 MILLIGRAM(S): at 17:20

## 2021-03-01 RX ADMIN — HEPARIN SODIUM 5000 UNIT(S): 5000 INJECTION INTRAVENOUS; SUBCUTANEOUS at 06:56

## 2021-03-01 NOTE — PROGRESS NOTE ADULT - SUBJECTIVE AND OBJECTIVE BOX
Postop Day  __1_ s/p   C- Section    THERAPY:  [  ] Spinal morphine   [ x ] Epidural morphine   [  ] IV PCA Hydromorphone 1 mg/ml    acetaminophen   Tablet .. 975 milliGRAM(s) Oral <User Schedule>  butorphanol Injectable 0.125 milliGRAM(s) IV Push every 6 hours PRN  dexAMETHasone  Injectable 4 milliGRAM(s) IV Push every 6 hours PRN  diphenhydrAMINE 25 milliGRAM(s) Oral every 6 hours PRN  diphtheria/tetanus/pertussis (acellular) Vaccine (ADAcel) 0.5 milliLiter(s) IntraMuscular once  ferrous    sulfate 325 milliGRAM(s) Oral daily  heparin   Injectable 5000 Unit(s) SubCutaneous every 12 hours  ibuprofen  Tablet. 600 milliGRAM(s) Oral every 6 hours  ketorolac   Injectable 30 milliGRAM(s) IV Push every 6 hours  labetalol 300 milliGRAM(s) Oral every 8 hours  lanolin Ointment 1 Application(s) Topical every 6 hours PRN  levothyroxine 50 MICROGram(s) Oral daily  magnesium hydroxide Suspension 30 milliLiter(s) Oral two times a day PRN  naloxone Injectable 0.1 milliGRAM(s) IV Push every 3 minutes PRN  NIFEdipine XL 30 milliGRAM(s) Oral daily  ondansetron Injectable 4 milliGRAM(s) IV Push every 6 hours PRN  oxyCODONE    IR 5 milliGRAM(s) Oral every 3 hours PRN  oxyCODONE    IR 10 milliGRAM(s) Oral every 3 hours PRN  oxyCODONE    IR 5 milliGRAM(s) Oral every 3 hours PRN  oxyCODONE    IR 5 milliGRAM(s) Oral once PRN  prenatal multivitamin 1 Tablet(s) Oral daily  simethicone 80 milliGRAM(s) Chew every 4 hours PRN      T(C): 36.6 (03-01-21 @ 05:33), Max: 36.8 (03-01-21 @ 01:44)  HR: 89 (03-01-21 @ 05:33) (65 - 90)  BP: 120/73 (03-01-21 @ 05:33) (103/79 - 136/89)  RR: 18 (03-01-21 @ 05:33) (10 - 21)  SpO2: 97% (03-01-21 @ 05:33) (96% - 100%)    Pain:   ______mild_____ at rest;  _____moderate______with activity    Sedation Score:	  [ x ] Alert	    [  ] Drowsy        [  ] Arousable	[  ] Asleep	[  ] Unresponsive    Side Effects:	  [  ] None	     [ x ] Nausea        [  ] Pruritus        [  ] Weakness   [  ] Numbness        ASSESSMENT/ PLAN  [  x ] Side effects resolving      [  x ] Patient made aware of PRN meds available     [ x] Discontinue & switch to PRN pain medications        [  ] Continue       Patient states lower extremities feel and move normally. No apparent anesthetic complications.

## 2021-03-01 NOTE — PROGRESS NOTE ADULT - SUBJECTIVE AND OBJECTIVE BOX
OB Progress Note:  Delivery, POD#1    S: 37yo POD#1 s/p LTCS . Pain well controlled, tolerating regular diet, passing flatus, ambulating without difficulty, voiding spontaneously. Denies heavy vaginal bleeding, N/V, CP/SOB/lightheadedness/dizziness.     O:   Vital Signs Last 24 Hrs  T(C): 36.6 (01 Mar 2021 05:33), Max: 36.8 (2021 07:40)  T(F): 97.8 (01 Mar 2021 05:33), Max: 98.3 (01 Mar 2021 01:44)  HR: 89 (01 Mar 2021 05:33) (65 - 90)  BP: 120/73 (01 Mar 2021 05:33) (103/79 - 162/78)  BP(mean): 86 (2021 12:30) (76 - 92)  RR: 18 (01 Mar 2021 05:33) (10 - 21)  SpO2: 97% (01 Mar 2021 05:33) (96% - 100%)    Labs:  Blood type: B Positive  Rubella IgG: RPR: Negative                          10.2<L>   14.06<H> >-----------< 215    (  @ 06:22 )             30.6<L>                        12.7   10.57<H> >-----------< 269    (  @ 19:56 )             38.0                        11.9   8.93 >-----------< 276    (  @ 08:53 )             37.4                        12.6   8.15 >-----------< 286    (  @ 20:40 )             38.7    21 @ 19:56      137  |  104  |  6<L>  ----------------------------<  83  3.9   |  21<L>  |  0.63    21 @ 08:53      134<L>  |  104  |  6<L>  ----------------------------<  86  3.7   |  21<L>  |  0.61    21 @ 20:40      136  |  102  |  7   ----------------------------<  85  3.9   |  21<L>  |  0.61        Ca    9.7      2021 19:56  Ca    9.0      2021 08:53  Ca    9.3      2021 20:40    TPro  6.6  /  Alb  3.5  /  TBili  0.7  /  DBili  x   /  AST  12  /  ALT  9   /  AlkPhos  185<H>  21 @ 19:56  TPro  6.3  /  Alb  3.2<L>  /  TBili  0.5  /  DBili  x   /  AST  13  /  ALT  7   /  AlkPhos  172<H>  21 @ 08:53  TPro  6.5  /  Alb  3.7  /  TBili  0.5  /  DBili  x   /  AST  14  /  ALT  9   /  AlkPhos  181<H>  21 @ 20:40          PE:  General: NAD  Abdomen: Mildly distended, appropriately tender, Fundus firm, incision c/d/i.  VE: No heavy vaginal bleeding  Extremities: No erythema, no pitting edema

## 2021-03-01 NOTE — LACTATION INITIAL EVALUATION - LACTATION INTERVENTIONS
reviewed with  mother breastfeeding section  of  postpartum  book./initiate skin to skin/initiate hand expression routine/initiate/review early breastfeeding management guidelines/initiate/review techniques for position and latch/review techniques to manage sore nipples/engorgement/initiate/review breast massage/compression

## 2021-03-01 NOTE — PROGRESS NOTE ADULT - SUBJECTIVE AND OBJECTIVE BOX
·   Patient seen at bedside resting comfortably offers no new complaints. not yet ambulating, Voiding  spontaneously yet.  + flatus;  no bm; tolerating clr liq diet. both breast and bottle feeding. Denies HA, blurry vision or epigastric pain, CP, SOB, N/V/D,  dizziness, palpitations, worsening vaginal bleeding.    Vital Signs Last 24 Hrs  T(C): 36.6 (01 Mar 2021 05:33), Max: 36.8 (01 Mar 2021 01:44)  T(F): 97.8 (01 Mar 2021 05:33), Max: 98.3 (01 Mar 2021 01:44)  HR: 89 (01 Mar 2021 05:33) (65 - 90)  BP: 120/73 (01 Mar 2021 05:33) (103/79 - 136/89)  BP(mean): 86 (2021 12:30) (76 - 92)  RR: 18 (01 Mar 2021 05:33) (10 - 21)  SpO2: 97% (01 Mar 2021 05:33) (96% - 100%)    Gen: A&O x 3, NAD  Chest: CTA B/L  Cardiac: S1,S2  RRR  Breast: Soft, nontender, nonengorged  Abdomen: +BS; soft; Nontender, nondistended; dressing removed incision C/D/I steri strips in place  Gyn: minimal lochia   Extremities: Nontender, venodynes in place                          10.2   14.06 )-----------( 215      ( 01 Mar 2021 06:22 )             30.6           Assessment	  A/P: 35yo w/ hx of cHTN POD#1 s/p LTCS.  Patient is stable, BP well controlled, and doing well post-operatively.         Problem/Plan - 1:  ·  Problem:  delivery delivered.  Plan: - c/w labetalol 300 TIDand nifedipine 30mg XL for cHTN  - Continue regular diet  - Increase ambulation  - Continue motrin, tylenol, oxycodone PRN for pain control.    - appropriate H/H drop on AM labs  - Consider Dc tomorrow if BP well controlled  - RTO in 3 days for BP check after discharge    Tayler Corona AGNP-C  [phone] ·   Patient seen at bedside resting comfortably offers no new complaints. not yet ambulating, Nguyen catheter insitu, no Voiding  spontaneously yet.  + flatus;  no bm; tolerating clr liq diet. both breast and bottle feeding. Denies HA, blurry vision or epigastric pain, CP, SOB, N/V/D,  dizziness, palpitations, worsening vaginal bleeding.    Vital Signs Last 24 Hrs  T(C): 36.6 (01 Mar 2021 05:33), Max: 36.8 (01 Mar 2021 01:44)  T(F): 97.8 (01 Mar 2021 05:33), Max: 98.3 (01 Mar 2021 01:44)  HR: 89 (01 Mar 2021 05:33) (65 - 90)  BP: 120/73 (01 Mar 2021 05:33) (103/79 - 136/89)  BP(mean): 86 (2021 12:30) (76 - 92)  RR: 18 (01 Mar 2021 05:33) (10 - 21)  SpO2: 97% (01 Mar 2021 05:33) (96% - 100%)    Gen: A&O x 3, NAD  Chest: CTA B/L  Cardiac: S1,S2  RRR  Breast: Soft, nontender, nonengorged  Abdomen: +BS; soft; Nontender, nondistended; dressing removed incision C/D/I steri strips in place  Gyn: minimal lochia   Extremities: Nontender, venodynes in place                          10.2   14.06 )-----------( 215      ( 01 Mar 2021 06:22 )             30.6           Assessment	  A/P: 35yo w/ hx of cHTN POD#1 s/p LTCS.  Patient is stable, BP well controlled, and doing well post-operatively.         Problem/Plan - 1:  ·  Problem:  delivery delivered.  Plan: - c/w labetalol 300 TIDand nifedipine 30mg XL for cHTN  - Continue regular diet  - Increase ambulation  - Continue motrin, tylenol, oxycodone PRN for pain control.    - appropriate H/H drop on AM labs  - Consider Dc tomorrow if BP well controlled  - RTO in 3 days for BP check after discharge    Tayler Corona AGNP-C  [phone] ·   Patient seen at bedside resting comfortably offers no new complaints. not yet ambulating,  Voiding  spontaneously.  + flatus;  no bm; tolerating clr liq diet. both breast and bottle feeding. Denies HA, blurry vision or epigastric pain, CP, SOB, N/V/D,  dizziness, palpitations, worsening vaginal bleeding.    Vital Signs Last 24 Hrs  T(C): 36.6 (01 Mar 2021 05:33), Max: 36.8 (01 Mar 2021 01:44)  T(F): 97.8 (01 Mar 2021 05:33), Max: 98.3 (01 Mar 2021 01:44)  HR: 89 (01 Mar 2021 05:33) (65 - 90)  BP: 120/73 (01 Mar 2021 05:33) (103/79 - 136/89)  BP(mean): 86 (2021 12:30) (76 - 92)  RR: 18 (01 Mar 2021 05:33) (10 - 21)  SpO2: 97% (01 Mar 2021 05:33) (96% - 100%)    Gen: A&O x 3, NAD  Chest: CTA B/L  Cardiac: S1,S2  RRR  Breast: Soft, nontender, nonengorged  Abdomen: +BS; soft; Nontender, nondistended; dressing removed incision C/D/I steri strips in place  Gyn: minimal lochia   Extremities: Nontender, venodynes in place                          10.2   14.06 )-----------( 215      ( 01 Mar 2021 06:22 )             30.6           Assessment	  A/P: 37yo w/ hx of cHTN POD#1 s/p LTCS.  Patient is stable, BP well controlled, and doing well post-operatively.         Problem/Plan - 1:  ·  Problem:  delivery delivered.  Plan: - c/w labetalol 300 TIDand nifedipine 30mg XL for cHTN  - Continue regular diet  - Increase ambulation  - Continue motrin, tylenol, oxycodone PRN for pain control.    - appropriate H/H drop on AM labs  - Consider Dc tomorrow if BP well controlled  - RTO in 3 days for BP check after discharge    Tayler Corona AGNP-C  [phone]

## 2021-03-01 NOTE — PROGRESS NOTE ADULT - PROBLEM SELECTOR PLAN 1
- c/w labetalol 300 TIDand nifedipine 30mg XL for cHTN  - Continue regular diet  - Increase ambulation  - Continue motrin, tylenol, oxycodone PRN for pain control.    - appropriate H/H drop on AM labs    Tony Villalobos, PGY-1
PEC prec d/w pt atlength  TID bp monitoring at home  f/u in 3 days for BP check in office  Cont antihypertensives as directed

## 2021-03-01 NOTE — PROGRESS NOTE ADULT - SUBJECTIVE AND OBJECTIVE BOX
ANESTHESIA POSTOP CHECK    36y Female POSTOP DAY 1     Vital Signs Last 24 Hrs  T(C): 36.6 (01 Mar 2021 05:33), Max: 36.8 (01 Mar 2021 01:44)  T(F): 97.8 (01 Mar 2021 05:33), Max: 98.3 (01 Mar 2021 01:44)  HR: 89 (01 Mar 2021 05:33) (65 - 90)  BP: 120/73 (01 Mar 2021 05:33) (103/79 - 136/89)  BP(mean): 86 (28 Feb 2021 12:30) (76 - 92)  RR: 18 (01 Mar 2021 05:33) (10 - 21)  SpO2: 97% (01 Mar 2021 05:33) (96% - 100%)  I&O's Summary    28 Feb 2021 07:01  -  01 Mar 2021 07:00  --------------------------------------------------------  IN: 500 mL / OUT: 2528 mL / NET: -2028 mL        [X ] NO APPARENT ANESTHESIA COMPLICATIONS      Comments:

## 2021-03-01 NOTE — LACTATION INITIAL EVALUATION - INTERVENTION OUTCOME
nbn demonstrated  deep latch and  performed  with sucking and swallowing  noted/verbalizes understanding

## 2021-03-02 VITALS
SYSTOLIC BLOOD PRESSURE: 126 MMHG | HEART RATE: 71 BPM | RESPIRATION RATE: 18 BRPM | TEMPERATURE: 99 F | DIASTOLIC BLOOD PRESSURE: 80 MMHG | OXYGEN SATURATION: 98 %

## 2021-03-02 RX ADMIN — Medication 50 MICROGRAM(S): at 05:17

## 2021-03-02 RX ADMIN — Medication 1 TABLET(S): at 12:42

## 2021-03-02 RX ADMIN — Medication 600 MILLIGRAM(S): at 05:16

## 2021-03-02 RX ADMIN — SIMETHICONE 80 MILLIGRAM(S): 80 TABLET, CHEWABLE ORAL at 12:41

## 2021-03-02 RX ADMIN — Medication 600 MILLIGRAM(S): at 00:01

## 2021-03-02 RX ADMIN — Medication 325 MILLIGRAM(S): at 12:44

## 2021-03-02 RX ADMIN — Medication 975 MILLIGRAM(S): at 14:45

## 2021-03-02 RX ADMIN — Medication 600 MILLIGRAM(S): at 12:41

## 2021-03-02 RX ADMIN — HEPARIN SODIUM 5000 UNIT(S): 5000 INJECTION INTRAVENOUS; SUBCUTANEOUS at 05:16

## 2021-03-02 RX ADMIN — Medication 300 MILLIGRAM(S): at 06:30

## 2021-03-02 RX ADMIN — Medication 300 MILLIGRAM(S): at 14:44

## 2021-03-02 NOTE — PROGRESS NOTE ADULT - ASSESSMENT
Assessment and Plan  POD # 2s/p C/S.  Doing well and bonding with baby  Encourage ambulation.  Incisional care and PO instructions reviewed.  CPC.  CHTN  ·	continue labetalol 300mg po TID, hold for BP <110/70  ·	continue Procardia 30xl daily  ·	check BP's TID prior to taking labetalol  ·	PEC precautions reviewed with pt.  discharge pt. home today  RTO 1 week for BP check/PRN  
A/P: 37yo w/ hx of cHTN POD#1 s/p LTCS.  Patient is stable, BP well controlled, and doing well post-operatively.

## 2021-03-02 NOTE — PROGRESS NOTE ADULT - ATTENDING COMMENTS
Patient seen and examined at bedside. Feeling well. Incision clean/dry/intact. BPs well controlled. Will plan for discharge home today with follow up in office for BP check.

## 2021-03-02 NOTE — PROGRESS NOTE ADULT - SUBJECTIVE AND OBJECTIVE BOX
Post-Operative Note, C/S  She is a  36y woman who is now post-operative day: 2    Subjective:  The patient feels well.  She is ambulating.   She is tolerating regular diet.  She denies nausea and vomiting; denies fever.  She is voiding.  Her pain is controlled; incisional pain is appropriate.  She reports normal postpartum bleeding.  She is breastfeeding.    Physical exam:    Vital Signs Last 24 Hrs  T(C): 36.9 (02 Mar 2021 10:00), Max: 37 (01 Mar 2021 22:04)  T(F): 98.4 (02 Mar 2021 10:00), Max: 98.6 (01 Mar 2021 22:04)  HR: 85 (02 Mar 2021 10:00) (75 - 100)  BP: 118/70 (02 Mar 2021 10:00) (118/70 - 133/86)  BP(mean): --  RR: 18 (02 Mar 2021 10:00) (18 - 18)  SpO2: 97% (02 Mar 2021 10:00) (97% - 100%)    Gen: NAD  Breast: Soft, nontender, not engorged.  Abdomen: Soft, nontender, no distension , firm uterine fundus at umbilicus.  Incision: C/D/I.  Pelvic: Normal lochia noted  Ext: No calf tenderness    LABS:                        10.2   14.06 )-----------( 215      ( 01 Mar 2021 06:22 )             30.6         Allergies    No Known Allergies      MEDICATIONS  (STANDING):  acetaminophen   Tablet .. 975 milliGRAM(s) Oral <User Schedule>  diphtheria/tetanus/pertussis (acellular) Vaccine (ADAcel) 0.5 milliLiter(s) IntraMuscular once  ferrous    sulfate 325 milliGRAM(s) Oral daily  heparin   Injectable 5000 Unit(s) SubCutaneous every 12 hours  ibuprofen  Tablet. 600 milliGRAM(s) Oral every 6 hours  labetalol 300 milliGRAM(s) Oral every 8 hours  levothyroxine 50 MICROGram(s) Oral daily  NIFEdipine XL 30 milliGRAM(s) Oral daily  prenatal multivitamin 1 Tablet(s) Oral daily    MEDICATIONS  (PRN):  diphenhydrAMINE 25 milliGRAM(s) Oral every 6 hours PRN Pruritus  lanolin Ointment 1 Application(s) Topical every 6 hours PRN Sore Nipples  magnesium hydroxide Suspension 30 milliLiter(s) Oral two times a day PRN Constipation  ondansetron Injectable 4 milliGRAM(s) IV Push every 6 hours PRN Nausea  oxyCODONE    IR 5 milliGRAM(s) Oral once PRN Moderate to Severe Pain (4-10)  oxyCODONE    IR 5 milliGRAM(s) Oral every 3 hours PRN Moderate to Severe Pain (4-10)  simethicone 80 milliGRAM(s) Chew every 4 hours PRN Gas

## 2021-03-03 ENCOUNTER — NON-APPOINTMENT (OUTPATIENT)
Age: 36
End: 2021-03-03

## 2021-03-03 PROBLEM — D21.9 BENIGN NEOPLASM OF CONNECTIVE AND OTHER SOFT TISSUE, UNSPECIFIED: Chronic | Status: ACTIVE | Noted: 2021-02-26

## 2021-03-03 RX ORDER — ACETAMINOPHEN 500 MG/1
500 TABLET ORAL
Refills: 0 | Status: ACTIVE | COMMUNITY
Start: 2021-03-03

## 2021-03-03 RX ORDER — LEVOTHYROXINE SODIUM 0.1 MG/1
100 TABLET ORAL
Qty: 90 | Refills: 2 | Status: DISCONTINUED | COMMUNITY
Start: 2020-11-11 | End: 2021-03-03

## 2021-03-03 RX ORDER — ASPIRIN 81 MG/1
81 TABLET ORAL
Refills: 0 | Status: DISCONTINUED | COMMUNITY
Start: 2020-11-13 | End: 2021-03-03

## 2021-03-05 ENCOUNTER — NON-APPOINTMENT (OUTPATIENT)
Age: 36
End: 2021-03-05

## 2021-03-05 DIAGNOSIS — O99.283 ENDOCRINE, NUTRITIONAL AND METABOLIC DISEASES COMPLICATING PREGNANCY, THIRD TRIMESTER: ICD-10-CM

## 2021-03-05 DIAGNOSIS — O34.13 MATERNAL CARE FOR BENIGN TUMOR OF CORPUS UTERI, THIRD TRIMESTER: ICD-10-CM

## 2021-03-05 DIAGNOSIS — O10.013 PRE-EXISTING ESSENTIAL HYPERTENSION COMPLICATING PREGNANCY, THIRD TRIMESTER: ICD-10-CM

## 2021-03-05 DIAGNOSIS — Z3A.39 39 WEEKS GESTATION OF PREGNANCY: ICD-10-CM

## 2021-03-08 ENCOUNTER — NON-APPOINTMENT (OUTPATIENT)
Age: 36
End: 2021-03-08

## 2021-03-09 ENCOUNTER — NON-APPOINTMENT (OUTPATIENT)
Age: 36
End: 2021-03-09

## 2021-03-16 ENCOUNTER — NON-APPOINTMENT (OUTPATIENT)
Age: 36
End: 2021-03-16

## 2021-03-22 ENCOUNTER — APPOINTMENT (OUTPATIENT)
Dept: CARDIOLOGY | Facility: CLINIC | Age: 36
End: 2021-03-22
Payer: COMMERCIAL

## 2021-03-22 ENCOUNTER — NON-APPOINTMENT (OUTPATIENT)
Age: 36
End: 2021-03-22

## 2021-03-22 VITALS
WEIGHT: 178 LBS | OXYGEN SATURATION: 99 % | HEIGHT: 64 IN | HEART RATE: 71 BPM | BODY MASS INDEX: 30.39 KG/M2 | SYSTOLIC BLOOD PRESSURE: 131 MMHG | DIASTOLIC BLOOD PRESSURE: 88 MMHG | RESPIRATION RATE: 16 BRPM

## 2021-03-22 PROCEDURE — 93000 ELECTROCARDIOGRAM COMPLETE: CPT

## 2021-03-22 PROCEDURE — 99072 ADDL SUPL MATRL&STAF TM PHE: CPT

## 2021-03-22 PROCEDURE — 99204 OFFICE O/P NEW MOD 45 MIN: CPT

## 2021-03-22 NOTE — PHYSICAL EXAM
[General Appearance - Well Developed] : well developed [Normal Appearance] : normal appearance [Well Groomed] : well groomed [General Appearance - Well Nourished] : well nourished [No Deformities] : no deformities [General Appearance - In No Acute Distress] : no acute distress [Normal Conjunctiva] : the conjunctiva exhibited no abnormalities [Eyelids - No Xanthelasma] : the eyelids demonstrated no xanthelasmas [Normal Oral Mucosa] : normal oral mucosa [No Oral Pallor] : no oral pallor [No Oral Cyanosis] : no oral cyanosis [Normal Jugular Venous A Waves Present] : normal jugular venous A waves present [Normal Jugular Venous V Waves Present] : normal jugular venous V waves present [No Jugular Venous Hernandez A Waves] : no jugular venous hernandez A waves [Heart Rate And Rhythm] : heart rate and rhythm were normal [Heart Sounds] : normal S1 and S2 [Murmurs] : no murmurs present [Respiration, Rhythm And Depth] : normal respiratory rhythm and effort [Exaggerated Use Of Accessory Muscles For Inspiration] : no accessory muscle use [Auscultation Breath Sounds / Voice Sounds] : lungs were clear to auscultation bilaterally [Abdomen Soft] : soft [Abdomen Tenderness] : non-tender [Abdomen Mass (___ Cm)] : no abdominal mass palpated [Abnormal Walk] : normal gait [Gait - Sufficient For Exercise Testing] : the gait was sufficient for exercise testing [Nail Clubbing] : no clubbing of the fingernails [Cyanosis, Localized] : no localized cyanosis [Petechial Hemorrhages (___cm)] : no petechial hemorrhages [] : no ischemic changes

## 2021-03-22 NOTE — DISCUSSION/SUMMARY
[FreeTextEntry1] : 36 year old woman  who comes in for evaluation of BP\par -Will decrease Labetalol to 300 mg BID\par -Continue Procardia 60 mg daily\par -Check TTE\par -FU in 6 weeks

## 2021-03-23 ENCOUNTER — NON-APPOINTMENT (OUTPATIENT)
Age: 36
End: 2021-03-23

## 2021-04-02 ENCOUNTER — NON-APPOINTMENT (OUTPATIENT)
Age: 36
End: 2021-04-02

## 2021-04-05 ENCOUNTER — APPOINTMENT (OUTPATIENT)
Dept: DISASTER EMERGENCY | Facility: OTHER | Age: 36
End: 2021-04-05

## 2021-04-26 ENCOUNTER — APPOINTMENT (OUTPATIENT)
Dept: DISASTER EMERGENCY | Facility: OTHER | Age: 36
End: 2021-04-26

## 2021-04-29 ENCOUNTER — OUTPATIENT (OUTPATIENT)
Dept: OUTPATIENT SERVICES | Facility: HOSPITAL | Age: 36
LOS: 1 days | End: 2021-04-29

## 2021-04-29 ENCOUNTER — APPOINTMENT (OUTPATIENT)
Dept: ENDOCRINOLOGY | Facility: CLINIC | Age: 36
End: 2021-04-29
Payer: COMMERCIAL

## 2021-04-29 ENCOUNTER — APPOINTMENT (OUTPATIENT)
Dept: CV DIAGNOSITCS | Facility: HOSPITAL | Age: 36
End: 2021-04-29
Payer: COMMERCIAL

## 2021-04-29 VITALS
OXYGEN SATURATION: 97 % | HEART RATE: 71 BPM | WEIGHT: 177.47 LBS | TEMPERATURE: 98.4 F | DIASTOLIC BLOOD PRESSURE: 66 MMHG | SYSTOLIC BLOOD PRESSURE: 106 MMHG | HEIGHT: 64.57 IN | BODY MASS INDEX: 29.93 KG/M2

## 2021-04-29 DIAGNOSIS — E03.9 ENDOCRINE, NUTRITIONAL AND METABOLIC DISEASES COMPLICATING PREGNANCY, UNSPECIFIED TRIMESTER: ICD-10-CM

## 2021-04-29 DIAGNOSIS — Z98.890 OTHER SPECIFIED POSTPROCEDURAL STATES: Chronic | ICD-10-CM

## 2021-04-29 DIAGNOSIS — O99.280 ENDOCRINE, NUTRITIONAL AND METABOLIC DISEASES COMPLICATING PREGNANCY, UNSPECIFIED TRIMESTER: ICD-10-CM

## 2021-04-29 DIAGNOSIS — I10 ESSENTIAL (PRIMARY) HYPERTENSION: ICD-10-CM

## 2021-04-29 PROCEDURE — 99213 OFFICE O/P EST LOW 20 MIN: CPT | Mod: 25

## 2021-04-29 PROCEDURE — 93306 TTE W/DOPPLER COMPLETE: CPT | Mod: 26

## 2021-04-29 PROCEDURE — 99072 ADDL SUPL MATRL&STAF TM PHE: CPT

## 2021-04-29 PROCEDURE — 36415 COLL VENOUS BLD VENIPUNCTURE: CPT

## 2021-05-03 PROBLEM — O99.280 HYPOTHYROIDISM IN PREGNANCY: Status: RESOLVED | Noted: 2020-11-13 | Resolved: 2021-05-03

## 2021-05-03 LAB
T4 FREE SERPL-MCNC: 1.4 NG/DL
TSH SERPL-ACNC: 2.06 UIU/ML

## 2021-05-03 NOTE — PHYSICAL EXAM
[Alert] : alert [Well Nourished] : well nourished [Healthy Appearance] : healthy appearance [No Acute Distress] : no acute distress [Well Developed] : well developed [Normal Voice/Communication] : normal voice communication [Normal Sclera/Conjunctiva] : normal sclera/conjunctiva [No Proptosis] : no proptosis [No Neck Mass] : no neck mass was observed [No LAD] : no lymphadenopathy [Supple] : the neck was supple [No Thyroid Nodules] : no palpable thyroid nodules [Thyroid Not Enlarged] : the thyroid was not enlarged [No Respiratory Distress] : no respiratory distress [No Accessory Muscle Use] : no accessory muscle use [Normal Rate and Effort] : normal respiratory rate and effort [Normal Rate] : heart rate was normal [No Edema] : no peripheral edema [Normal Gait] : normal gait [No Clubbing, Cyanosis] : no clubbing  or cyanosis of the fingernails [No Involuntary Movements] : no involuntary movements were seen [Acanthosis Nigricans] : no acanthosis nigricans [Hirsutism] : no hirsutism [No Tremors] : no tremors [Normal Affect] : the affect was normal [Normal Insight/Judgement] : insight and judgment were intact [Normal Mood] : the mood was normal

## 2021-05-03 NOTE — ASSESSMENT
[FreeTextEntry1] : This is a 36-year-old female with subclinical hypothyroidism who is approximately 2 months postpartum..\par She is currently on levothyroxine 50 mcg daily.\par Check TFTs.\par Will adjust levothyroxine if needed.\par She is clinically euthyroid.\par

## 2021-05-03 NOTE — HISTORY OF PRESENT ILLNESS
[FreeTextEntry1] : CC: Hypothyroidism\par \par This is a 36-year-old female with subclinical hypothyroidism, hypertension, here for follow up.\par She delivered a healthy boy on 2021 at 39 weeks +5 days gestation.  Labor was induced due to hypertension.  She had a .  She is breast-feeding.\par Blood work from 2020 showed TSH 7.03.  Free T4 was normal.  Repeat blood work on 2020 showed TSH 5.200, total T4 11.8. She was started on LT4 100mcg qd. \par  She is now on levothyroxine 50 mcg daily. She takes this in the morning on an empty stomach. \par There is no history of thyroid disease in the family.\par

## 2021-05-04 ENCOUNTER — NON-APPOINTMENT (OUTPATIENT)
Age: 36
End: 2021-05-04

## 2021-05-04 DIAGNOSIS — E03.9 HYPOTHYROIDISM, UNSPECIFIED: ICD-10-CM

## 2021-05-10 ENCOUNTER — NON-APPOINTMENT (OUTPATIENT)
Age: 36
End: 2021-05-10

## 2021-05-10 ENCOUNTER — APPOINTMENT (OUTPATIENT)
Dept: CARDIOLOGY | Facility: CLINIC | Age: 36
End: 2021-05-10
Payer: COMMERCIAL

## 2021-05-10 VITALS
TEMPERATURE: 97 F | OXYGEN SATURATION: 99 % | DIASTOLIC BLOOD PRESSURE: 78 MMHG | HEIGHT: 64 IN | BODY MASS INDEX: 29.88 KG/M2 | WEIGHT: 175 LBS | SYSTOLIC BLOOD PRESSURE: 120 MMHG | HEART RATE: 71 BPM

## 2021-05-10 PROCEDURE — 99072 ADDL SUPL MATRL&STAF TM PHE: CPT

## 2021-05-10 PROCEDURE — 93000 ELECTROCARDIOGRAM COMPLETE: CPT

## 2021-05-10 PROCEDURE — 99213 OFFICE O/P EST LOW 20 MIN: CPT

## 2021-05-10 NOTE — HISTORY OF PRESENT ILLNESS
[FreeTextEntry1] : 36 year old woman  with known chronic HTN who comes in for followup. About 10 weeks postpartum\par Diagnosed in  with HTN and on Labetalol and Nifedipine since that time\par \par TTE 21- normal LV and RV function\par \par PCP was trying to titrate down doses recently (pre-pregnancy) Nifedipine 30 mg daily, Labetalol 100 mg BID\par Currently --Labetalol 300 mg BID/Procardia 60 mg daily\par \par Feels well, BP has been well controlled--has held occasional doses of labetalol due to SBP\par EKG normal\par

## 2021-05-10 NOTE — DISCUSSION/SUMMARY
[FreeTextEntry1] : 36 year old woman  who comes in for evaluation of BP\par -Continue Labetalol to 300 mg BID\par -Continue Procardia 60 mg daily\par -FU in 2 months\par \par

## 2021-06-21 ENCOUNTER — NON-APPOINTMENT (OUTPATIENT)
Age: 36
End: 2021-06-21

## 2021-06-21 ENCOUNTER — APPOINTMENT (OUTPATIENT)
Dept: CARDIOLOGY | Facility: CLINIC | Age: 36
End: 2021-06-21
Payer: COMMERCIAL

## 2021-06-21 VITALS
DIASTOLIC BLOOD PRESSURE: 76 MMHG | SYSTOLIC BLOOD PRESSURE: 118 MMHG | HEART RATE: 63 BPM | BODY MASS INDEX: 30.22 KG/M2 | HEIGHT: 64 IN | WEIGHT: 177 LBS | OXYGEN SATURATION: 98 %

## 2021-06-21 PROCEDURE — 99072 ADDL SUPL MATRL&STAF TM PHE: CPT

## 2021-06-21 PROCEDURE — 93000 ELECTROCARDIOGRAM COMPLETE: CPT

## 2021-06-21 PROCEDURE — 99213 OFFICE O/P EST LOW 20 MIN: CPT

## 2021-06-21 RX ORDER — NIFEDIPINE 60 MG/1
60 TABLET, FILM COATED, EXTENDED RELEASE ORAL DAILY
Qty: 90 | Refills: 3 | Status: DISCONTINUED | COMMUNITY
Start: 2020-09-05 | End: 2021-06-21

## 2021-06-21 NOTE — DISCUSSION/SUMMARY
[FreeTextEntry1] : 36 year old woman  who comes in for evaluation of BP\par -Continue Labetalol to 300 mg BID\par -DC Procardia\par -FU in 2 months\par \par

## 2021-06-21 NOTE — HISTORY OF PRESENT ILLNESS
[FreeTextEntry1] : 36 year old woman  with known chronic HTN who comes in for followup. About 16 weeks postpartum\par Diagnosed in  with HTN and on Labetalol and Nifedipine since that time\par \par TTE 21- normal LV and RV function\par \par PCP was trying to titrate down doses recently (pre-pregnancy) Nifedipine 30 mg daily, Labetalol 100 mg BID\par Currently --Labetalol 300 mg BID/Procardia 60 mg daily\par \par EKG normal\par She admits to skipping both the AM labetalol and Procardia about 3 times a week.

## 2021-06-22 ENCOUNTER — NON-APPOINTMENT (OUTPATIENT)
Age: 36
End: 2021-06-22

## 2021-08-17 LAB
T4 FREE SERPL-MCNC: 1.2 NG/DL
TSH SERPL-ACNC: 5.29 UIU/ML

## 2021-08-23 ENCOUNTER — NON-APPOINTMENT (OUTPATIENT)
Age: 36
End: 2021-08-23

## 2021-08-23 ENCOUNTER — APPOINTMENT (OUTPATIENT)
Dept: CARDIOLOGY | Facility: CLINIC | Age: 36
End: 2021-08-23
Payer: COMMERCIAL

## 2021-08-23 VITALS
SYSTOLIC BLOOD PRESSURE: 117 MMHG | RESPIRATION RATE: 16 BRPM | BODY MASS INDEX: 34.75 KG/M2 | DIASTOLIC BLOOD PRESSURE: 82 MMHG | HEART RATE: 76 BPM | WEIGHT: 177 LBS | HEIGHT: 60 IN | OXYGEN SATURATION: 99 %

## 2021-08-23 LAB
T4 FREE SERPL-MCNC: 1.3 NG/DL
TSH SERPL-ACNC: 3.84 UIU/ML

## 2021-08-23 PROCEDURE — 99213 OFFICE O/P EST LOW 20 MIN: CPT

## 2021-08-23 PROCEDURE — 93000 ELECTROCARDIOGRAM COMPLETE: CPT

## 2021-08-23 NOTE — HISTORY OF PRESENT ILLNESS
[FreeTextEntry1] : 36 year old woman  with known chronic HTN who comes in for followup. About 16 weeks postpartum\par Diagnosed in  with HTN and on Labetalol and Nifedipine since that time\par TTE 21- normal LV and RV function\par PCP was trying to titrate down doses recently (pre-pregnancy) Nifedipine 30 mg daily, Labetalol 100 mg BID\par Currently --Labetalol 300 mg BID\par EKG normal\par

## 2021-08-23 NOTE — DISCUSSION/SUMMARY
[FreeTextEntry1] : 36 year old woman  who comes in for evaluation of BP\par -Continue Labetalol to 300 mg BID\par -FU in 2 months\par \par

## 2021-08-24 ENCOUNTER — NON-APPOINTMENT (OUTPATIENT)
Age: 36
End: 2021-08-24

## 2022-01-18 NOTE — HISTORY OF PRESENT ILLNESS
Not sure why this was routed to me only  I have never met patient    I do OB and happy to see her after she sees RN   I work with Dr Yi and other OBGYN's  Please help her set up appoint with RN first then can see me.  First ob should be 40min  Mary Jane Castillo D.O.     [FreeTextEntry1] : 36 year old woman  with known chronic HTN who comes in 3 week post partum for followup\par Diagnosed in  with HTN and on Labetalol and Nifedipine since that time\par \par PCP was trying to titrate down doses recently (pre-pregnancy) Nifedipine 30 mg daily, Labetalol 100 mg BID\par During pregnancy -->was initially only on Nifedipine; in thrid trimester--Was on both Labetalol Nifedipine\par Currently --Labetalol 300 mg TID/Procardia 60 mg daily\par \par Feels well, BP has been well controlled--has held occasional doses of labetalol due to SBP\par EKG normal\par

## 2022-02-22 ENCOUNTER — NON-APPOINTMENT (OUTPATIENT)
Age: 37
End: 2022-02-22

## 2022-02-22 ENCOUNTER — APPOINTMENT (OUTPATIENT)
Dept: CARDIOLOGY | Facility: CLINIC | Age: 37
End: 2022-02-22
Payer: COMMERCIAL

## 2022-02-22 VITALS
BODY MASS INDEX: 30.38 KG/M2 | SYSTOLIC BLOOD PRESSURE: 156 MMHG | OXYGEN SATURATION: 99 % | HEIGHT: 64 IN | HEART RATE: 73 BPM | DIASTOLIC BLOOD PRESSURE: 106 MMHG

## 2022-02-22 VITALS — SYSTOLIC BLOOD PRESSURE: 145 MMHG | DIASTOLIC BLOOD PRESSURE: 92 MMHG | TEMPERATURE: 98.2 F

## 2022-02-22 VITALS — WEIGHT: 177 LBS | BODY MASS INDEX: 34.57 KG/M2

## 2022-02-22 PROCEDURE — 93000 ELECTROCARDIOGRAM COMPLETE: CPT

## 2022-02-22 PROCEDURE — 99214 OFFICE O/P EST MOD 30 MIN: CPT

## 2022-02-22 NOTE — REASON FOR VISIT
[Initial Evaluation] : an initial evaluation of [Hypertension] : hypertension [FreeTextEntry2] : Chronic HTN

## 2022-02-22 NOTE — HISTORY OF PRESENT ILLNESS
[FreeTextEntry1] : 36 year old woman  with known chronic HTN who comes in for followup. About 10 months postpartum\par Diagnosed in  with HTN\par TTE 21- normal LV and RV function\par EKG normal\par Will readd Nifedipine 30 mg daily (currently on Labetalol 300 mg BID)

## 2022-02-22 NOTE — DISCUSSION/SUMMARY
[FreeTextEntry1] : 36 year old woman  who comes in for evaluation of BP\par -Continue Labetalol to 300 mg BID\par -Will readd Nifedipine 30 mg daily \par -FU in 3 months\par \par

## 2022-06-21 ENCOUNTER — NON-APPOINTMENT (OUTPATIENT)
Age: 37
End: 2022-06-21

## 2022-06-21 ENCOUNTER — APPOINTMENT (OUTPATIENT)
Dept: CARDIOLOGY | Facility: CLINIC | Age: 37
End: 2022-06-21
Payer: COMMERCIAL

## 2022-06-21 VITALS
TEMPERATURE: 98.7 F | HEART RATE: 79 BPM | OXYGEN SATURATION: 98 % | SYSTOLIC BLOOD PRESSURE: 111 MMHG | DIASTOLIC BLOOD PRESSURE: 74 MMHG | BODY MASS INDEX: 29.7 KG/M2 | HEIGHT: 64 IN

## 2022-06-21 VITALS — BODY MASS INDEX: 29.7 KG/M2 | WEIGHT: 173 LBS

## 2022-06-21 PROCEDURE — 93000 ELECTROCARDIOGRAM COMPLETE: CPT

## 2022-06-21 PROCEDURE — 99213 OFFICE O/P EST LOW 20 MIN: CPT

## 2022-06-21 NOTE — REASON FOR VISIT
[Hypertension] : hypertension [Initial Evaluation] : an initial evaluation of [FreeTextEntry2] : Chronic HTN

## 2022-06-21 NOTE — HISTORY OF PRESENT ILLNESS
[FreeTextEntry1] : 36 year old woman  with known chronic HTN who comes in for followup. About 16 months postpartum\par Diagnosed in  with HTN\par TTE 21- normal LV and RV function\par EKG normal\par Currently controlled on Labetalol 300 mg BID and Nifedipine 30 mg daily (added alst visit)

## 2022-06-21 NOTE — DISCUSSION/SUMMARY
[FreeTextEntry1] : 37 year old woman  who comes in for evaluation of BP\par -continue Labetalol to 300 mg BID\par -continue Nifedipine 30 mg daily\par -FU in 6 months\par \par

## 2022-06-22 NOTE — OB RN PATIENT PROFILE - WEIGHT IN KG
Left message to call back to schedule colonoscopy via [employer] program [phone].        87.5

## 2022-09-07 NOTE — OB PROVIDER DELIVERY SUMMARY - NS_DELIVERYASSIST1_OBGYN_ALL_OB_FT
Discussed importance of compliance with ocular medications and follow up exams to prevent loss of vision. Cee

## 2022-09-14 NOTE — OB PROVIDER H&P - NSHPROSALLOTHERNEGRD_GEN_ALL_CORE
[Post Op: _________] : a [unfilled] post op visit [Spouse] : spouse All other review of systems negative, except as noted in HPI

## 2022-12-20 ENCOUNTER — NON-APPOINTMENT (OUTPATIENT)
Age: 37
End: 2022-12-20

## 2022-12-20 ENCOUNTER — APPOINTMENT (OUTPATIENT)
Dept: CARDIOLOGY | Facility: CLINIC | Age: 37
End: 2022-12-20

## 2022-12-20 VITALS
SYSTOLIC BLOOD PRESSURE: 131 MMHG | DIASTOLIC BLOOD PRESSURE: 88 MMHG | OXYGEN SATURATION: 98 % | TEMPERATURE: 97.3 F | BODY MASS INDEX: 29.71 KG/M2 | HEART RATE: 68 BPM | WEIGHT: 174 LBS | HEIGHT: 64 IN

## 2022-12-20 PROCEDURE — 99213 OFFICE O/P EST LOW 20 MIN: CPT | Mod: 25

## 2022-12-20 PROCEDURE — 93000 ELECTROCARDIOGRAM COMPLETE: CPT

## 2022-12-20 NOTE — DISCUSSION/SUMMARY
[FreeTextEntry1] : 37 year old woman  who comes in for evaluation of BP\par -continue Labetalol to 300 mg BID\par -continue Nifedipine 30 mg daily\par -FU in 6 months\par \par  [EKG obtained to assist in diagnosis and management of assessed problem(s)] : EKG obtained to assist in diagnosis and management of assessed problem(s)

## 2022-12-20 NOTE — HISTORY OF PRESENT ILLNESS
[FreeTextEntry1] : 37 year old woman  with known chronic HTN who comes in for followup. About 16 months postpartum\par Diagnosed in  with HTN\par TTE 21- normal LV and RV function\par EKG normal\par Currently controlled on Labetalol 300 mg BID and Nifedipine 30 mg daily

## 2023-06-23 ENCOUNTER — NON-APPOINTMENT (OUTPATIENT)
Age: 38
End: 2023-06-23

## 2023-06-23 ENCOUNTER — APPOINTMENT (OUTPATIENT)
Dept: CARDIOLOGY | Facility: CLINIC | Age: 38
End: 2023-06-23
Payer: COMMERCIAL

## 2023-06-23 VITALS
BODY MASS INDEX: 30.55 KG/M2 | HEIGHT: 64 IN | SYSTOLIC BLOOD PRESSURE: 128 MMHG | OXYGEN SATURATION: 99 % | DIASTOLIC BLOOD PRESSURE: 84 MMHG | HEART RATE: 71 BPM

## 2023-06-23 VITALS — BODY MASS INDEX: 30.55 KG/M2 | WEIGHT: 178 LBS

## 2023-06-23 PROCEDURE — 93000 ELECTROCARDIOGRAM COMPLETE: CPT

## 2023-06-23 PROCEDURE — 99214 OFFICE O/P EST MOD 30 MIN: CPT | Mod: 25

## 2023-06-23 NOTE — HISTORY OF PRESENT ILLNESS
[FreeTextEntry1] : 37 year old woman  with known chronic HTN who comes in for followup. About 2.5 years postpartum\par Diagnosed in  with HTN\par TTE 21- normal LV and RV function\par EKG normal\par Currently controlled on Labetalol 300 mg BID and Nifedipine 30 mg daily

## 2023-10-27 NOTE — OB PROVIDER IHI INDUCTION/AUGMENTATION NOTE - NS_IHIMETHOD_OBGYN_ALL_OB
Hi, this is Kim Beal calling. I called earlier this morning about the a blood test for Lyme disease for Malvin Norwood. My other question is we are striking out with finding a psychologist for him every he has Medicaid and they say on the site that they accept Medicaid but when we call they don't. So if you could just get back to me about any referrals you could provide, my number is [phone].  Thank you
[employer] PO

## 2023-12-15 ENCOUNTER — APPOINTMENT (OUTPATIENT)
Dept: CARDIOLOGY | Facility: CLINIC | Age: 38
End: 2023-12-15

## 2024-01-05 ENCOUNTER — APPOINTMENT (OUTPATIENT)
Dept: CARDIOLOGY | Facility: CLINIC | Age: 39
End: 2024-01-05
Payer: COMMERCIAL

## 2024-01-05 ENCOUNTER — NON-APPOINTMENT (OUTPATIENT)
Age: 39
End: 2024-01-05

## 2024-01-05 VITALS
WEIGHT: 182 LBS | SYSTOLIC BLOOD PRESSURE: 115 MMHG | OXYGEN SATURATION: 99 % | BODY MASS INDEX: 31.07 KG/M2 | HEIGHT: 64 IN | HEART RATE: 78 BPM | DIASTOLIC BLOOD PRESSURE: 76 MMHG

## 2024-01-05 DIAGNOSIS — I10 ESSENTIAL (PRIMARY) HYPERTENSION: ICD-10-CM

## 2024-01-05 PROCEDURE — 99213 OFFICE O/P EST LOW 20 MIN: CPT | Mod: 25

## 2024-01-05 PROCEDURE — 93000 ELECTROCARDIOGRAM COMPLETE: CPT

## 2024-01-05 NOTE — DISCUSSION/SUMMARY
[FreeTextEntry1] : 38 year old woman  who comes in for evaluation of BP -continue Labetalol to 300 mg BID -continue Nifedipine 30 mg daily -FU in 6 months   [EKG obtained to assist in diagnosis and management of assessed problem(s)] : EKG obtained to assist in diagnosis and management of assessed problem(s)

## 2024-01-05 NOTE — HISTORY OF PRESENT ILLNESS
[FreeTextEntry1] : 37 year old woman  with known chronic HTN who comes in for followup. About 2.5 years postpartum Diagnosed in  with HTN TTE 21- normal LV and RV function EKG normal Currently controlled on Labetalol 300 mg BID and Nifedipine 30 mg daily

## 2024-05-06 RX ORDER — NIFEDIPINE 30 MG/1
30 TABLET, EXTENDED RELEASE ORAL DAILY
Qty: 90 | Refills: 3 | Status: ACTIVE | COMMUNITY
Start: 2022-02-22 | End: 1900-01-01

## 2024-05-06 RX ORDER — LABETALOL HYDROCHLORIDE 300 MG/1
300 TABLET, FILM COATED ORAL
Qty: 180 | Refills: 3 | Status: ACTIVE | COMMUNITY
Start: 2020-12-07 | End: 1900-01-01

## 2024-10-03 NOTE — OB RN TRIAGE NOTE - NS_TRIAGETIMEOF NOTIFICATION_OBGYN_ALL_OB_DT
-- DO NOT REPLY / DO NOT REPLY ALL --  -- This inbox is not monitored. If this was sent to the wrong provider or department, reroute message to P [facility] Reroute pool. --  -- Message is from [facility] ([facility]) --    Request for Medical Clearance    PreOp Appointment Scheduled?  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 Denis) (Other) [phone]            Alternative phone number: none     Can a detailed message be left?  Yes - Voicemail     Patient has been advised the message will be addressed within 2-3 business days         07-Jan-2021 18:27

## 2024-11-02 NOTE — OB RN INTRAOPERATIVE NOTE - NS_ELECTROSURGICALUNITBIOMEDNUMBER_OBGYN_ALL_OB_FT
Pt presents to unit with c/o of decreased FM. Pt is 37.1 weeks, .  Pt states that she is feeling movement but not as strong as she is used to.   Pt denies LOF or VB.   EFM applied, NST button provided to pt and pt is instructed to push button when she feels movement.  
Written and oral discharge instructions provided to pt. Pt verbalized understanding. Denies any questions at this time. Pt ambulatory off unit accompanied by S/O.    
[id number]

## 2024-11-12 NOTE — OB PROVIDER TRIAGE NOTE - NSLASTDATEVISIT_OBGYN_ALL_OB
What Type Of Note Output Would You Prefer (Optional)?: Standard Output
What Is The Reason For Today's Visit?: Full Body Skin Examination with No Concerns
What Is The Reason For Today's Visit? (Being Monitored For X): the development of new lesions
Known

## 2025-01-15 NOTE — OB PROVIDER IHI INDUCTION/AUGMENTATION NOTE - NS_GESTAGE_OBGYN_ALL_OB_FT
Pt called to let us know her ins information is changing but she does not have her card yet. She states she will call back with that info when she has it in hand.    [id number]

## 2025-01-16 NOTE — OB PROVIDER LABOR PROGRESS NOTE - NS_OBIHICONTRACTIONPATTERNDETAILS_OBGYN_ALL_OB_FT
Problem: Adult Inpatient Plan of Care  Goal: Plan of Care Review  Outcome: Progressing  Goal: Patient-Specific Goal (Individualized)  Outcome: Progressing  Goal: Absence of Hospital-Acquired Illness or Injury  Outcome: Progressing  Goal: Optimal Comfort and Wellbeing  Outcome: Progressing     Problem: Fall Injury Risk  Goal: Absence of Fall and Fall-Related Injury  Outcome: Progressing     Problem: Skin Injury Risk Increased  Goal: Skin Health and Integrity  Outcome: Progressing     
q1-4min
Irregular
CTX 2-3min

## 2025-02-11 NOTE — OB PROVIDER TRIAGE NOTE - NSHOSPITALIZATION_OBGYN_ALL_OB
Pt records reviewed.  Pt will be referred to Hepatology due to PBC with Cirrhosis  stable liver function test  Initial referral received  from Dr. Abrahan Nagel  Referral letter sent to patient.    Results in Care Everywhere under summary episode note 1/15/2025  RECORDS SCANNED IN MEDIA UNDER HEPATOLOGY REFERRAL .      Creatinine Lvl [0.50-1.40 mg/dL] 0.75 mg/dL  Sodium Lvl [136-145 mmol/L] 140 mmol/l  Albumin [3.5-5.0 gm/dL] 3.8 gm/dL  Alk Phos [ unit/L] 94 unit/L  ALT [7-56 unit/L] 9 unit/L  AST [5-35 unit/L] 19 unit/L  Bili Total [0.2-1.2 mg/dL] 0.9 mg/dL     No

## 2025-02-28 NOTE — OB PROVIDER TRIAGE NOTE - NSHPPHYSICALEXAM_GEN_ALL_CORE
SW/CM Discharge Plan  Informed patient is ready for discharge.  Patient to be picked up by BLS for 1700.. Patient/interested person has been counseled for post hospitalization care.  Patient agrees and understands goals and plan. Initial implementation of the patient’s discharge plan has been arranged, including any devices/equipment needed for discharge. Discharge plan communicated to MD, RN, and Receiving Facility/Agency.    Patient’s discharge destination is Rehabilitation/Skilled Care.    Selected Continued Care - Admitted Since 2/21/2025       Destination  Coordination complete.      Service Provider Selected Services Address Phone Fax    [facility] - SNF Skilled Nursing [address] [place] [phone] -- --                  Report #: [phone]     Taya Rasmussen, MSW, LSW  Care Manager Social Work  [id number]     .Continued Stay SW/MARLENE Assessment/Plan of Care Note       Active Substitute Decision Maker (SDM)       [facility] - Daughter   Primary Phone: [phone] (Mobile)                 Progress note:  MAYA following re: Discharge Planning    MAYA called pt's daughter/SDM Aracelis ([phone].     MAYA reported to Aracelis that insurance auth was still pending. MAYA reported that once they have insurance auth and pt is med cleared that he can go to [place].     MAYA explained [facility] and fees. Aracelis reported that she would want to wait until DC to decide due to pt's bedsore.     Aracelis reported understanding and agreement for the update and DC plan.     CM to follow up.   _______________________  ADDENDUM 15:38    MAYA received call from Opal ([phone] from [facility]. She reported that pt has insurance auth from 2/28-3/6.     MAYA updated MD and RN. MD reported that pt can leave today.     MAYA discussed with Aracelis, pt's daughter that MD wants DC. Aracelis confirmed Medic[facility].     MAYA updated facility and Aracelis on goal time of [facility] for 1700.     MAYA submitted [facility] referral.  Waiting for response.     Report #: [phone]  ________________    Treatment team decided to change [facility] to BLS.     Lurdes from [place] called SW and reported that the BLS will be the same time for 1700.     Disposition Recommendations:  Preliminary discharge destination: Planned Discharge Destination: Rehabilitation/Skilled Care  SW/CM recommendation for discharge: Sub-acute nursing home    Destination Pharmacy: [facility] - [place], [place] - [address] Pharmacy confirmed with facility    Discharge Plan/Needs:     Continued Care and Services - Admitted Since 2/21/2025       Destination  Coordination complete.      Service Provider Request Status Selected Services Address Phone Fax    [place] [facility] - [facility]  Selected Skilled Nursing [address] [facility] [phone] -- --    [facility] - SNF Pending - No Request Sent -- [address] [place] [address] [phone] --                    Prior To Hospitalization:    Living Situation: Children, Alone, Family members and residing at House    .  Support Systems: Family members, Children   Home Devices/Equipment: None            Mobility Assist Devices: Wheelchair, Standard walker, Cane   Type of Service Prior to Hospitalization: Home health aide               Patient/Family discharge goal (s):  Sub-acute nursing home     Prior Function     Transfers: Modified Independent (02/24/25 0925 : Nany Weir, OT)  Ambulation in the Home: Modified  Independent (02/24/25 0925 : Nany Weir, JACKSON)       Current Function  Last Filed Values         Value Time User    Current Function  slightly below baseline level of function 2/28/2025  9:43 AM Jessica Burden, PT    Therapy Impairments  balance; activity tolerance; strength 2/28/2025  9:43 AM Jessica Burden, PT    ADLs Requiring Support  bed mobility; transfers; ambulation; stairs 2/28/2025  9:43 AM Jessica Burden, PT            Therapy Recommendations for Discharge:   PT:      Last  Filed Values         Value Time User    PT Discharge Needs  therapy 5 or more times per week 2/28/2025  9:43 AM Jessica Burden, PT          OT:       Last Filed Values         Value Time User    OT Discharge Needs  therapy 5 or more times per week 2/25/2025  3:38 PM Phyllis Choi, OTR/L          SLP:    Last Filed Values       None            Mobility Equipment Recommended for Discharge: rolling walker      Barriers to Discharge  Identified Barriers to Discharge/Transition Planning:          Taya Rasmussen, MSW, LSW  Care Manager Social Work  [id number]               Vital Signs Last 24 Hrs  T(C): 37 (07 Jan 2021 18:26), Max: 37 (07 Jan 2021 18:26)  T(F): 98.6 (07 Jan 2021 18:26), Max: 98.6 (07 Jan 2021 18:26)  HR: 75 (07 Jan 2021 18:52) (75 - 81)  BP: 146/84 (07 Jan 2021 18:52) (144/91 - 159/97)  RR: 16 (07 Jan 2021 18:26) (16 - 16)      Abdomen gravid, soft and nontender  NST -  TAS -  BPP - Vital Signs Last 24 Hrs  T(C): 37 (07 Jan 2021 18:26), Max: 37 (07 Jan 2021 18:26)  T(F): 98.6 (07 Jan 2021 18:26), Max: 98.6 (07 Jan 2021 18:26)  HR: 75 (07 Jan 2021 18:52) (75 - 81)  BP: 146/84 (07 Jan 2021 18:52) (144/91 - 159/97)  RR: 16 (07 Jan 2021 18:26) (16 - 16)      Abdomen gravid, soft and nontender  NST -  TAS -vertex presentation, anterior placenta, AJ 10.58cm, 1915gm  BPP -8/8